# Patient Record
Sex: FEMALE | Race: WHITE | Employment: FULL TIME | URBAN - METROPOLITAN AREA
[De-identification: names, ages, dates, MRNs, and addresses within clinical notes are randomized per-mention and may not be internally consistent; named-entity substitution may affect disease eponyms.]

---

## 2017-01-05 ENCOUNTER — OFFICE VISIT (OUTPATIENT)
Dept: INTERNAL MEDICINE CLINIC | Facility: CLINIC | Age: 27
End: 2017-01-05

## 2017-01-05 VITALS
TEMPERATURE: 98 F | SYSTOLIC BLOOD PRESSURE: 126 MMHG | BODY MASS INDEX: 37.95 KG/M2 | OXYGEN SATURATION: 99 % | HEART RATE: 104 BPM | WEIGHT: 206.25 LBS | HEIGHT: 62 IN | DIASTOLIC BLOOD PRESSURE: 88 MMHG | RESPIRATION RATE: 22 BRPM

## 2017-01-05 DIAGNOSIS — E66.9 OBESITY (BMI 30-39.9): Primary | ICD-10-CM

## 2017-01-05 DIAGNOSIS — Z51.81 THERAPEUTIC DRUG MONITORING: ICD-10-CM

## 2017-01-05 PROCEDURE — 99213 OFFICE O/P EST LOW 20 MIN: CPT | Performed by: INTERNAL MEDICINE

## 2017-01-05 RX ORDER — PHENTERMINE HYDROCHLORIDE 37.5 MG/1
37.5 TABLET ORAL
COMMUNITY
End: 2017-02-09

## 2017-01-05 RX ORDER — PHENTERMINE HYDROCHLORIDE 37.5 MG/1
37.5 TABLET ORAL
Qty: 30 TABLET | Refills: 0 | Status: SHIPPED | OUTPATIENT
Start: 2017-01-05 | End: 2017-02-04

## 2017-01-05 NOTE — PROGRESS NOTES
Patient presents with:  Weight Check       HPI: The pt presents today for physician-supervised medical weight loss programming and assessment for the diagnosis of overweight and/or obesity status.   Has been on FDA-approved prescription medication with Phen (bmi 30-39.9)  (primary encounter diagnosis)  Adult bmi 37.0-37.9 kg/sq m  Therapeutic drug monitoring    1. Continue FDA-approved prescription medication for the treatment of obesity. 2. Prescription medication refill given.   3. Continue to work on diet,

## 2017-01-23 ENCOUNTER — TELEPHONE (OUTPATIENT)
Dept: INTERNAL MEDICINE CLINIC | Facility: CLINIC | Age: 27
End: 2017-01-23

## 2017-01-23 NOTE — TELEPHONE ENCOUNTER
I agree. Stephany Liao. Archie Crawford MD  Diplomate, American Board of Internal Medicine  [facility]  [address] [facility],[address] Floor, [address], [address], [address]  T: [phone]; F: Veronica 5

## 2017-01-23 NOTE — TELEPHONE ENCOUNTER
Mother left message on triage line stating pt with possible influenza. Severe nausea and vomiting. Call returned and spoke with pt who stated she has been vomiting since 1 am.   She is only able to keep small sips of liquid down. C/o chills and fatigue.

## 2017-02-09 ENCOUNTER — OFFICE VISIT (OUTPATIENT)
Dept: INTERNAL MEDICINE CLINIC | Facility: CLINIC | Age: 27
End: 2017-02-09

## 2017-02-09 VITALS
TEMPERATURE: 99 F | HEART RATE: 126 BPM | BODY MASS INDEX: 37.17 KG/M2 | SYSTOLIC BLOOD PRESSURE: 122 MMHG | DIASTOLIC BLOOD PRESSURE: 80 MMHG | HEIGHT: 62 IN | WEIGHT: 202 LBS | OXYGEN SATURATION: 96 % | RESPIRATION RATE: 24 BRPM

## 2017-02-09 DIAGNOSIS — Z51.81 THERAPEUTIC DRUG MONITORING: ICD-10-CM

## 2017-02-09 DIAGNOSIS — E66.9 OBESITY (BMI 30-39.9): Primary | ICD-10-CM

## 2017-02-09 PROCEDURE — 99213 OFFICE O/P EST LOW 20 MIN: CPT | Performed by: INTERNAL MEDICINE

## 2017-02-09 RX ORDER — PHENTERMINE HYDROCHLORIDE 37.5 MG/1
37.5 TABLET ORAL
Qty: 30 TABLET | Refills: 0 | Status: SHIPPED | OUTPATIENT
Start: 2017-02-09 | End: 2017-03-16

## 2017-02-09 NOTE — PROGRESS NOTES
Patient presents with:  Weight Check       HPI: The pt presents today for physician-supervised medical weight loss programming and assessment for the diagnosis of overweight and/or obesity status.   Has been on FDA-approved prescription medication with Phen for the treatment of obesity. 2. Prescription medication refill given. 3. Continue to work on diet, exercise, stressor reduction, and other formal weight loss measures. 4. RTC 1 month for physician-supervised medical weight loss programming.     Troy Rice.

## 2017-02-16 ENCOUNTER — TELEPHONE (OUTPATIENT)
Dept: INTERNAL MEDICINE CLINIC | Facility: CLINIC | Age: 27
End: 2017-02-16

## 2017-03-16 ENCOUNTER — OFFICE VISIT (OUTPATIENT)
Dept: INTERNAL MEDICINE CLINIC | Facility: CLINIC | Age: 27
End: 2017-03-16

## 2017-03-16 VITALS
RESPIRATION RATE: 22 BRPM | TEMPERATURE: 99 F | SYSTOLIC BLOOD PRESSURE: 122 MMHG | HEART RATE: 101 BPM | WEIGHT: 202 LBS | HEIGHT: 62 IN | BODY MASS INDEX: 37.17 KG/M2 | OXYGEN SATURATION: 100 % | DIASTOLIC BLOOD PRESSURE: 70 MMHG

## 2017-03-16 DIAGNOSIS — E66.01 SEVERE OBESITY (BMI 35.0-39.9): Primary | ICD-10-CM

## 2017-03-16 DIAGNOSIS — Z51.81 THERAPEUTIC DRUG MONITORING: ICD-10-CM

## 2017-03-16 PROBLEM — E66.9 OBESITY (BMI 30-39.9): Status: RESOLVED | Noted: 2017-01-05 | Resolved: 2017-03-16

## 2017-03-16 PROCEDURE — 99213 OFFICE O/P EST LOW 20 MIN: CPT | Performed by: INTERNAL MEDICINE

## 2017-03-16 RX ORDER — PHENTERMINE HYDROCHLORIDE 37.5 MG/1
37.5 TABLET ORAL
Qty: 30 TABLET | Refills: 0 | Status: SHIPPED | OUTPATIENT
Start: 2017-03-16 | End: 2017-05-01

## 2017-03-16 NOTE — PROGRESS NOTES
Patient presents with:  Weight Check       HPI: The pt presents today for physician-supervised medical weight loss programming and assessment for the diagnosis of overweight and/or obesity status.   Has been on FDA-approved prescription medication with Phen FDA-approved prescription medication for the treatment of obesity. 2. Prescription medication refill given. 3. Add Metformin 500 mg q AM.  4. Continue to work on diet, exercise, stressor reduction, and other formal weight loss measures.   5. RTC 1 month f

## 2017-03-21 ENCOUNTER — NURSE ONLY (OUTPATIENT)
Dept: FAMILY MEDICINE CLINIC | Facility: CLINIC | Age: 27
End: 2017-03-21

## 2017-03-21 VITALS
HEART RATE: 92 BPM | RESPIRATION RATE: 20 BRPM | TEMPERATURE: 98 F | WEIGHT: 201 LBS | HEIGHT: 62 IN | OXYGEN SATURATION: 98 % | SYSTOLIC BLOOD PRESSURE: 116 MMHG | BODY MASS INDEX: 36.99 KG/M2 | DIASTOLIC BLOOD PRESSURE: 84 MMHG

## 2017-03-21 DIAGNOSIS — R59.0 POSTERIOR CERVICAL LYMPHADENOPATHY: ICD-10-CM

## 2017-03-21 DIAGNOSIS — H92.01 OTALGIA OF RIGHT EAR: Primary | ICD-10-CM

## 2017-03-21 DIAGNOSIS — R59.0 PREAURICULAR LYMPHADENOPATHY: ICD-10-CM

## 2017-03-21 PROCEDURE — 99213 OFFICE O/P EST LOW 20 MIN: CPT | Performed by: NURSE PRACTITIONER

## 2017-03-21 NOTE — PATIENT INSTRUCTIONS
Lymphadenopathy  Lymphadenopathy is swelling of the lymph nodes. Lymph nodes are small, bean-shaped glands around the body. What are lymph nodes? Lymph nodes are part of your immune system.  The glands are found in your neck, armpits, groin, chest, and You may also have symptoms from an infection causing the swollen glands. These symptoms may include fever, sore throat, body aches, or cough. Diagnosing lymphadenopathy  Your health care provider will ask about your health history and symptoms.  He or she © 7834-8256 [address], [address] [place]. All rights reserved. This information is not intended as a substitute for professional medical care. Always follow your healthcare professional's instructions.

## 2017-03-21 NOTE — PROGRESS NOTES
Barbara Flores is a 23year old female. CHIEF COMPLAINT:   Patient presents with:  Ear Pain: right ear x 2 days      HPI:   The patient complains of  2 day history of right ear pain. Denies reduced hearing in affected ear(s). No fever.  No recent URI symptoms THROAT: oral mucosa pink, moist. Posterior pharynx is not erythematous or injected. No exudates  NECK: supple, non-tender  LUNGS: clear to auscultation bilaterally, no wheezes or rhonchi.  Breathing is non labored  CARDIO: RRR without murmur  EXTREMITIES: n Dead cells and fluid build up in the lymph nodes as they help fight infection or disease. This causes them to swell in size. Enlarged lymph nodes are often near the source of infection. This can help to find the cause of an infection.  For example, swollen · Lymph node biopsy. If lymph nodes are swollen for 3 to 4 weeks, they may be checked with a biopsy. Small samples of lymph node tissue are taken and checked in a lab for signs of cancer.  You may be referred to a specialist in blood disorders and cancer (h

## 2017-05-01 DIAGNOSIS — Z51.81 THERAPEUTIC DRUG MONITORING: Primary | ICD-10-CM

## 2017-05-01 DIAGNOSIS — E66.01 SEVERE OBESITY (BMI 35.0-39.9): ICD-10-CM

## 2017-05-01 RX ORDER — PHENTERMINE HYDROCHLORIDE 37.5 MG/1
37.5 TABLET ORAL
Qty: 30 TABLET | Refills: 0 | Status: SHIPPED | OUTPATIENT
Start: 2017-05-01 | End: 2017-05-04

## 2017-05-04 ENCOUNTER — OFFICE VISIT (OUTPATIENT)
Dept: INTERNAL MEDICINE CLINIC | Facility: CLINIC | Age: 27
End: 2017-05-04

## 2017-05-04 VITALS
SYSTOLIC BLOOD PRESSURE: 126 MMHG | WEIGHT: 200.5 LBS | HEART RATE: 122 BPM | DIASTOLIC BLOOD PRESSURE: 74 MMHG | BODY MASS INDEX: 36.9 KG/M2 | HEIGHT: 62 IN | OXYGEN SATURATION: 100 % | RESPIRATION RATE: 22 BRPM | TEMPERATURE: 99 F

## 2017-05-04 DIAGNOSIS — Z51.81 THERAPEUTIC DRUG MONITORING: ICD-10-CM

## 2017-05-04 DIAGNOSIS — L98.9 SKIN DISORDER: ICD-10-CM

## 2017-05-04 DIAGNOSIS — E66.01 SEVERE OBESITY (BMI 35.0-39.9): Primary | ICD-10-CM

## 2017-05-04 PROCEDURE — 99213 OFFICE O/P EST LOW 20 MIN: CPT | Performed by: INTERNAL MEDICINE

## 2017-05-04 RX ORDER — BETAMETHASONE DIPROPIONATE 0.5 MG/G
CREAM TOPICAL
Qty: 45 G | Refills: 0 | Status: SHIPPED | OUTPATIENT
Start: 2017-05-04 | End: 2019-10-01

## 2017-05-04 RX ORDER — PHENTERMINE HYDROCHLORIDE 37.5 MG/1
37.5 TABLET ORAL
Qty: 30 TABLET | Refills: 0 | Status: SHIPPED | OUTPATIENT
Start: 2017-05-04 | End: 2017-05-04

## 2017-05-04 NOTE — PROGRESS NOTES
Patient presents with:  Weight Check       HPI: The pt presents today for physician-supervised medical weight loss programming and assessment for the diagnosis of overweight and/or obesity status.   Has been on FDA-approved prescription medication with Phen lb  Gen - A&Ox3, NAD, obese  HEENT - PERRL, EOMI, OP is clear  Lungs - CTAB  CV - RRR, nl s1, s2, no M  Psych - nl mood/affect  Skin - inflamed, dry patch on back of neck      A/P:  Severe obesity (bmi 35.0-39.9) (hcc)  (primary encounter diagnosis)  Adult

## 2017-06-05 ENCOUNTER — OFFICE VISIT (OUTPATIENT)
Dept: INTERNAL MEDICINE CLINIC | Facility: CLINIC | Age: 27
End: 2017-06-05

## 2017-06-05 VITALS
DIASTOLIC BLOOD PRESSURE: 74 MMHG | HEIGHT: 62 IN | TEMPERATURE: 99 F | SYSTOLIC BLOOD PRESSURE: 118 MMHG | RESPIRATION RATE: 23 BRPM | HEART RATE: 96 BPM | BODY MASS INDEX: 36.53 KG/M2 | WEIGHT: 198.5 LBS

## 2017-06-05 DIAGNOSIS — E66.01 SEVERE OBESITY (BMI 35.0-39.9): Primary | ICD-10-CM

## 2017-06-05 DIAGNOSIS — Z51.81 THERAPEUTIC DRUG MONITORING: ICD-10-CM

## 2017-06-05 PROCEDURE — 99213 OFFICE O/P EST LOW 20 MIN: CPT | Performed by: INTERNAL MEDICINE

## 2017-06-05 RX ORDER — PHENTERMINE HYDROCHLORIDE 37.5 MG/1
37.5 TABLET ORAL
COMMUNITY
End: 2017-06-05

## 2017-06-05 RX ORDER — PHENTERMINE HYDROCHLORIDE 37.5 MG/1
37.5 TABLET ORAL
Qty: 30 TABLET | Refills: 0 | Status: SHIPPED | OUTPATIENT
Start: 2017-06-05 | End: 2017-07-06

## 2017-06-05 NOTE — PROGRESS NOTES
Patient presents with:  Weight Check       HPI: The pt presents today for physician-supervised medical weight loss programming and assessment for the diagnosis of overweight and/or obesity status.   Has been on FDA-approved prescription medication with Phen obese  HEENT - PERRL, EOMI, OP is clear  Lungs - CTAB  CV - RRR, nl s1, s2, no M  Psych - nl mood/affect      A/P:  Severe obesity (bmi 35.0-39.9) (hcc)  (primary encounter diagnosis)  Adult bmi 36.0-36.9 kg/sq m  Therapeutic drug monitoring    1.  Continue

## 2017-07-06 ENCOUNTER — OFFICE VISIT (OUTPATIENT)
Dept: INTERNAL MEDICINE CLINIC | Facility: CLINIC | Age: 27
End: 2017-07-06

## 2017-07-06 VITALS
SYSTOLIC BLOOD PRESSURE: 122 MMHG | BODY MASS INDEX: 36.48 KG/M2 | DIASTOLIC BLOOD PRESSURE: 82 MMHG | HEIGHT: 62 IN | WEIGHT: 198.25 LBS | HEART RATE: 60 BPM | RESPIRATION RATE: 12 BRPM | TEMPERATURE: 98 F

## 2017-07-06 DIAGNOSIS — E66.01 SEVERE OBESITY (BMI 35.0-39.9): Primary | ICD-10-CM

## 2017-07-06 DIAGNOSIS — Z51.81 THERAPEUTIC DRUG MONITORING: ICD-10-CM

## 2017-07-06 PROCEDURE — 99213 OFFICE O/P EST LOW 20 MIN: CPT | Performed by: INTERNAL MEDICINE

## 2017-07-06 RX ORDER — PHENTERMINE HYDROCHLORIDE 37.5 MG/1
37.5 TABLET ORAL
Qty: 30 TABLET | Refills: 0 | Status: SHIPPED | OUTPATIENT
Start: 2017-07-06 | End: 2017-08-08

## 2017-07-06 NOTE — PROGRESS NOTES
Patient presents with: Follow - Up: Weight check        HPI: The pt presents today for physician-supervised medical weight loss programming and assessment for the diagnosis of overweight and/or obesity status.   Has been on FDA-approved prescription medica oz  05/04/17 : 200 lb 8 oz  03/21/17 : 201 lb  03/16/17 : 202 lb  02/09/17 : 202 lb  Gen - A&Ox3, NAD, obese  HEENT - PERRL, EOMI, OP is clear  Lungs - CTAB  CV - RRR, nl s1, s2, no M  Psych - nl mood/affect      A/P:  Severe obesity (bmi 35.0-39.9) (hcc)

## 2017-08-08 ENCOUNTER — OFFICE VISIT (OUTPATIENT)
Dept: INTERNAL MEDICINE CLINIC | Facility: CLINIC | Age: 27
End: 2017-08-08

## 2017-08-08 VITALS
HEIGHT: 62 IN | HEART RATE: 68 BPM | BODY MASS INDEX: 36.12 KG/M2 | DIASTOLIC BLOOD PRESSURE: 80 MMHG | TEMPERATURE: 99 F | RESPIRATION RATE: 16 BRPM | WEIGHT: 196.25 LBS | SYSTOLIC BLOOD PRESSURE: 112 MMHG

## 2017-08-08 DIAGNOSIS — Z51.81 THERAPEUTIC DRUG MONITORING: ICD-10-CM

## 2017-08-08 DIAGNOSIS — E66.01 SEVERE OBESITY (BMI 35.0-39.9): Primary | ICD-10-CM

## 2017-08-08 PROCEDURE — 99213 OFFICE O/P EST LOW 20 MIN: CPT | Performed by: INTERNAL MEDICINE

## 2017-08-08 RX ORDER — PHENTERMINE HYDROCHLORIDE 37.5 MG/1
37.5 TABLET ORAL
Qty: 30 TABLET | Refills: 0 | Status: SHIPPED | OUTPATIENT
Start: 2017-08-08 | End: 2017-09-07

## 2017-08-08 NOTE — PROGRESS NOTES
Patient presents with:  Weight Check       HPI: The pt presents today for physician-supervised medical weight loss programming and assessment for the diagnosis of overweight and/or obesity status.   Has been on FDA-approved prescription medication with Phen oz  06/05/17 : 198 lb 8 oz  05/04/17 : 200 lb 8 oz  03/21/17 : 201 lb  03/16/17 : 202 lb  Gen - A&Ox3, NAD, obese  HEENT - PERRL, EOMI, OP is clear  Lungs - CTAB  CV - RRR, nl s1, s2, no M  Psych - nl mood/affect      A/P:  Severe obesity (bmi 35.0-39.9) (

## 2017-09-07 ENCOUNTER — TELEPHONE (OUTPATIENT)
Dept: INTERNAL MEDICINE CLINIC | Facility: CLINIC | Age: 27
End: 2017-09-07

## 2017-09-07 RX ORDER — PHENTERMINE HYDROCHLORIDE 37.5 MG/1
37.5 TABLET ORAL
Qty: 30 TABLET | Refills: 0 | Status: SHIPPED | OUTPATIENT
Start: 2017-09-07 | End: 2017-10-09

## 2017-09-07 NOTE — TELEPHONE ENCOUNTER
Patient called in requesting a refill for Phentermine HCl 37.5 MG Oral Tab    Appointment Date: 09/19/17

## 2017-09-19 ENCOUNTER — OFFICE VISIT (OUTPATIENT)
Dept: INTERNAL MEDICINE CLINIC | Facility: CLINIC | Age: 27
End: 2017-09-19

## 2017-09-19 VITALS
WEIGHT: 197.5 LBS | RESPIRATION RATE: 16 BRPM | SYSTOLIC BLOOD PRESSURE: 108 MMHG | TEMPERATURE: 98 F | BODY MASS INDEX: 36.34 KG/M2 | HEIGHT: 62 IN | HEART RATE: 100 BPM | DIASTOLIC BLOOD PRESSURE: 88 MMHG | OXYGEN SATURATION: 99 %

## 2017-09-19 DIAGNOSIS — Z23 FLU VACCINE NEED: ICD-10-CM

## 2017-09-19 DIAGNOSIS — E66.01 SEVERE OBESITY (BMI 35.0-39.9): Primary | ICD-10-CM

## 2017-09-19 DIAGNOSIS — Z51.81 THERAPEUTIC DRUG MONITORING: ICD-10-CM

## 2017-09-19 PROCEDURE — 99213 OFFICE O/P EST LOW 20 MIN: CPT | Performed by: INTERNAL MEDICINE

## 2017-09-19 PROCEDURE — 90471 IMMUNIZATION ADMIN: CPT | Performed by: INTERNAL MEDICINE

## 2017-09-19 PROCEDURE — 90686 IIV4 VACC NO PRSV 0.5 ML IM: CPT | Performed by: INTERNAL MEDICINE

## 2017-09-19 NOTE — PROGRESS NOTES
Patient presents with:  Weight Check: medication refill        HPI: The pt presents today for physician-supervised medical weight loss programming and assessment for the diagnosis of overweight and/or obesity status.   Has been on FDA-approved prescription 98.3 °F (36.8 °C) (Oral)   Resp 16   Ht 62\"   Wt 197 lb 8 oz   LMP 08/31/2017   SpO2 99%   BMI 36.12 kg/m²   Wt Readings from Last 6 Encounters:  09/19/17 : 197 lb 8 oz  08/08/17 : 196 lb 4 oz  07/06/17 : 198 lb 4 oz  06/05/17 : 198 lb 8 oz  05/04/17 : 20

## 2017-10-09 ENCOUNTER — TELEPHONE (OUTPATIENT)
Dept: INTERNAL MEDICINE CLINIC | Facility: CLINIC | Age: 27
End: 2017-10-09

## 2017-10-09 RX ORDER — PHENTERMINE HYDROCHLORIDE 37.5 MG/1
37.5 TABLET ORAL
Qty: 30 TABLET | Refills: 0 | Status: SHIPPED | OUTPATIENT
Start: 2017-10-09 | End: 2017-11-07

## 2017-10-09 NOTE — TELEPHONE ENCOUNTER
Patient had appt 630 for med f/u/month and we had to r/s for Dr Kemi Samuels; she is r/s for 11-7-17 so she will need auth for refill from him.  Her mother Jada Mann can  today  Please request refill

## 2017-11-07 ENCOUNTER — OFFICE VISIT (OUTPATIENT)
Dept: INTERNAL MEDICINE CLINIC | Facility: CLINIC | Age: 27
End: 2017-11-07

## 2017-11-07 VITALS
HEIGHT: 62 IN | BODY MASS INDEX: 36.16 KG/M2 | HEART RATE: 88 BPM | RESPIRATION RATE: 22 BRPM | SYSTOLIC BLOOD PRESSURE: 120 MMHG | TEMPERATURE: 98 F | WEIGHT: 196.5 LBS | DIASTOLIC BLOOD PRESSURE: 80 MMHG

## 2017-11-07 DIAGNOSIS — Z51.81 THERAPEUTIC DRUG MONITORING: ICD-10-CM

## 2017-11-07 DIAGNOSIS — E66.01 SEVERE OBESITY (BMI 35.0-39.9): Primary | ICD-10-CM

## 2017-11-07 PROCEDURE — 99213 OFFICE O/P EST LOW 20 MIN: CPT | Performed by: INTERNAL MEDICINE

## 2017-11-07 RX ORDER — PHENTERMINE HYDROCHLORIDE 37.5 MG/1
37.5 TABLET ORAL
Qty: 30 TABLET | Refills: 0 | Status: SHIPPED | OUTPATIENT
Start: 2017-11-07 | End: 2017-12-09

## 2017-11-07 NOTE — PROGRESS NOTES
Patient presents with: Follow - Up: 1 month follow up        HPI: The pt presents today for physician-supervised medical weight loss programming and assessment for the diagnosis of overweight and/or obesity status.   Has been on FDA-approved prescription m °F (36.7 °C) (Oral)   Resp 22   Ht 62\"   Wt 196 lb 8 oz   LMP 10/26/2017   BMI 35.94 kg/m²   Wt Readings from Last 6 Encounters:  11/07/17 : 196 lb 8 oz  09/19/17 : 197 lb 8 oz  08/08/17 : 196 lb 4 oz  07/06/17 : 198 lb 4 oz  06/05/17 : 198 lb 8 oz  05/04

## 2017-12-09 ENCOUNTER — OFFICE VISIT (OUTPATIENT)
Dept: INTERNAL MEDICINE CLINIC | Facility: CLINIC | Age: 27
End: 2017-12-09

## 2017-12-09 VITALS
DIASTOLIC BLOOD PRESSURE: 86 MMHG | HEART RATE: 112 BPM | BODY MASS INDEX: 36.34 KG/M2 | OXYGEN SATURATION: 94 % | WEIGHT: 197.5 LBS | TEMPERATURE: 99 F | RESPIRATION RATE: 20 BRPM | HEIGHT: 62 IN | SYSTOLIC BLOOD PRESSURE: 122 MMHG

## 2017-12-09 DIAGNOSIS — E66.01 SEVERE OBESITY (BMI 35.0-39.9): Primary | ICD-10-CM

## 2017-12-09 DIAGNOSIS — Z51.81 THERAPEUTIC DRUG MONITORING: ICD-10-CM

## 2017-12-09 PROCEDURE — 99213 OFFICE O/P EST LOW 20 MIN: CPT | Performed by: INTERNAL MEDICINE

## 2017-12-09 RX ORDER — PHENTERMINE HYDROCHLORIDE 37.5 MG/1
37.5 TABLET ORAL
Qty: 30 TABLET | Refills: 0 | Status: SHIPPED | OUTPATIENT
Start: 2017-12-09 | End: 2018-01-09

## 2017-12-09 NOTE — PROGRESS NOTES
Patient presents with: Follow - Up: 1 month        HPI: The pt presents today for physician-supervised medical weight loss programming and assessment for the diagnosis of overweight and/or obesity status.   Has been on FDA-approved prescription medication Readings from Last 6 Encounters:  12/09/17 : 197 lb 8 oz  11/07/17 : 196 lb 8 oz  09/19/17 : 197 lb 8 oz  08/08/17 : 196 lb 4 oz  07/06/17 : 198 lb 4 oz  06/05/17 : 198 lb 8 oz  Gen - A&Ox3, NAD  HEENT - PERRL, EOMI, OP is clear  Lungs - CTAB  CV - RRR, nl

## 2018-01-09 ENCOUNTER — OFFICE VISIT (OUTPATIENT)
Dept: INTERNAL MEDICINE CLINIC | Facility: CLINIC | Age: 28
End: 2018-01-09

## 2018-01-09 VITALS
TEMPERATURE: 98 F | HEART RATE: 81 BPM | BODY MASS INDEX: 36.53 KG/M2 | WEIGHT: 198.5 LBS | RESPIRATION RATE: 21 BRPM | DIASTOLIC BLOOD PRESSURE: 80 MMHG | SYSTOLIC BLOOD PRESSURE: 122 MMHG | HEIGHT: 62 IN

## 2018-01-09 DIAGNOSIS — E66.01 SEVERE OBESITY (BMI 35.0-39.9): Primary | ICD-10-CM

## 2018-01-09 DIAGNOSIS — Z51.81 THERAPEUTIC DRUG MONITORING: ICD-10-CM

## 2018-01-09 PROCEDURE — 99213 OFFICE O/P EST LOW 20 MIN: CPT | Performed by: INTERNAL MEDICINE

## 2018-01-09 RX ORDER — PHENTERMINE HYDROCHLORIDE 37.5 MG/1
37.5 TABLET ORAL
Qty: 30 TABLET | Refills: 0 | Status: SHIPPED | OUTPATIENT
Start: 2018-01-09 | End: 2018-02-15

## 2018-01-10 NOTE — PROGRESS NOTES
Patient presents with:  Weight Check      HPI: The pt presents today for physician-supervised medical weight loss programming and assessment for the diagnosis of overweight and/or obesity status.   Has been on FDA-approved prescription medication with Phent Encounters:  01/09/18 : 198 lb 8 oz  12/09/17 : 197 lb 8 oz  11/07/17 : 196 lb 8 oz  09/19/17 : 197 lb 8 oz  08/08/17 : 196 lb 4 oz  07/06/17 : 198 lb 4 oz  Gen - A&Ox3, NAD, morbidly obese  HEENT - PERRL, EOMI, OP is clear  Lungs - CTAB  CV - RRR, nl s1,

## 2018-02-15 ENCOUNTER — OFFICE VISIT (OUTPATIENT)
Dept: INTERNAL MEDICINE CLINIC | Facility: CLINIC | Age: 28
End: 2018-02-15

## 2018-02-15 VITALS
BODY MASS INDEX: 36.71 KG/M2 | HEIGHT: 62 IN | DIASTOLIC BLOOD PRESSURE: 86 MMHG | HEART RATE: 86 BPM | TEMPERATURE: 99 F | SYSTOLIC BLOOD PRESSURE: 124 MMHG | RESPIRATION RATE: 16 BRPM | WEIGHT: 199.5 LBS

## 2018-02-15 DIAGNOSIS — Z51.81 THERAPEUTIC DRUG MONITORING: ICD-10-CM

## 2018-02-15 DIAGNOSIS — E66.01 SEVERE OBESITY (BMI 35.0-39.9): Primary | ICD-10-CM

## 2018-02-15 PROCEDURE — 99213 OFFICE O/P EST LOW 20 MIN: CPT | Performed by: INTERNAL MEDICINE

## 2018-02-15 RX ORDER — PHENTERMINE HYDROCHLORIDE 37.5 MG/1
37.5 TABLET ORAL
Qty: 30 TABLET | Refills: 0 | Status: SHIPPED | OUTPATIENT
Start: 2018-02-15 | End: 2018-03-20

## 2018-02-15 RX ORDER — TOPIRAMATE 25 MG/1
25 TABLET ORAL DAILY
COMMUNITY
End: 2018-03-20 | Stop reason: SINTOL

## 2018-02-15 NOTE — PROGRESS NOTES
Patient presents with:  Weight Check: Est Pt. 1 month follow up       HPI: The pt presents today for physician-supervised medical weight loss programming and assessment for the diagnosis of overweight and/or obesity status.   Has been on FDA-approved prescr 124/86   Pulse 86   Temp 98.6 °F (37 °C) (Oral)   Resp 16   Ht 62\"   Wt 199 lb 8 oz   BMI 36.49 kg/m²   Wt Readings from Last 6 Encounters:  02/15/18 : 199 lb 8 oz  01/09/18 : 198 lb 8 oz  12/09/17 : 197 lb 8 oz  11/07/17 : 196 lb 8 oz  09/19/17 : 197 lb

## 2018-03-20 ENCOUNTER — OFFICE VISIT (OUTPATIENT)
Dept: INTERNAL MEDICINE CLINIC | Facility: CLINIC | Age: 28
End: 2018-03-20

## 2018-03-20 VITALS
WEIGHT: 201.75 LBS | TEMPERATURE: 98 F | SYSTOLIC BLOOD PRESSURE: 118 MMHG | HEIGHT: 62 IN | RESPIRATION RATE: 16 BRPM | DIASTOLIC BLOOD PRESSURE: 84 MMHG | BODY MASS INDEX: 37.13 KG/M2 | HEART RATE: 88 BPM

## 2018-03-20 DIAGNOSIS — Z51.81 THERAPEUTIC DRUG MONITORING: ICD-10-CM

## 2018-03-20 DIAGNOSIS — E66.01 SEVERE OBESITY (BMI 35.0-39.9): Primary | ICD-10-CM

## 2018-03-20 PROCEDURE — 99213 OFFICE O/P EST LOW 20 MIN: CPT | Performed by: INTERNAL MEDICINE

## 2018-03-20 RX ORDER — PHENTERMINE HYDROCHLORIDE 37.5 MG/1
37.5 TABLET ORAL
Qty: 30 TABLET | Refills: 0 | Status: SHIPPED | OUTPATIENT
Start: 2018-03-20 | End: 2018-04-26

## 2018-03-20 RX ORDER — BUPROPION HYDROCHLORIDE 75 MG/1
75 TABLET ORAL 2 TIMES DAILY
Qty: 60 TABLET | Refills: 0 | Status: SHIPPED | OUTPATIENT
Start: 2018-03-20 | End: 2018-04-26

## 2018-03-21 NOTE — PROGRESS NOTES
Patient presents with:  Weight Check       HPI: The pt presents today for physician-supervised medical weight loss programming and assessment for the diagnosis of overweight and/or obesity status.   Has been on FDA-approved prescription medication with Phen medication for the treatment of obesity. 2. Prescription medication refill for Phentermine given. 3. Start Bupropion 75 mg BID. 4. Continue to work on diet, exercise, stressor reduction, and other formal weight loss measures.   5. RTC 1 month for jaxon

## 2018-04-06 ENCOUNTER — LAB ENCOUNTER (OUTPATIENT)
Dept: LAB | Facility: HOSPITAL | Age: 28
End: 2018-04-06
Attending: INTERNAL MEDICINE
Payer: COMMERCIAL

## 2018-04-06 DIAGNOSIS — E66.01 SEVERE OBESITY (BMI 35.0-39.9): ICD-10-CM

## 2018-04-06 PROCEDURE — 36415 COLL VENOUS BLD VENIPUNCTURE: CPT

## 2018-04-06 PROCEDURE — 80061 LIPID PANEL: CPT

## 2018-04-06 PROCEDURE — 80050 GENERAL HEALTH PANEL: CPT

## 2018-04-06 PROCEDURE — 83036 HEMOGLOBIN GLYCOSYLATED A1C: CPT

## 2018-04-17 ENCOUNTER — OFFICE VISIT (OUTPATIENT)
Dept: FAMILY MEDICINE CLINIC | Facility: CLINIC | Age: 28
End: 2018-04-17

## 2018-04-17 VITALS
SYSTOLIC BLOOD PRESSURE: 124 MMHG | BODY MASS INDEX: 37 KG/M2 | WEIGHT: 200 LBS | DIASTOLIC BLOOD PRESSURE: 80 MMHG | TEMPERATURE: 99 F | RESPIRATION RATE: 16 BRPM | OXYGEN SATURATION: 98 % | HEART RATE: 102 BPM

## 2018-04-17 DIAGNOSIS — J02.9 SORE THROAT: Primary | ICD-10-CM

## 2018-04-17 PROCEDURE — 87880 STREP A ASSAY W/OPTIC: CPT | Performed by: PHYSICIAN ASSISTANT

## 2018-04-17 PROCEDURE — 99213 OFFICE O/P EST LOW 20 MIN: CPT | Performed by: PHYSICIAN ASSISTANT

## 2018-04-17 NOTE — PROGRESS NOTES
CHIEF COMPLAINT:   Patient presents with:  Sore Throat: for 2 days, this am sore throat worse  Nasal Congestion      HPI:   Maribell Hayes is a 33year old female who presents with sore throat and intermittent sinus congestion.  Symptoms of sore throat have be Maternal Grandfather    • Heart Disorder Paternal Grandmother    • Heart Disease Paternal Grandmother    • Other [OTHER] Paternal Grandmother    • Diabetes Paternal Grandfather    • Heart Disease Paternal Grandfather       Smoking status: Never Smoker Yes/No   Kit Lot # [id number] Numeric   Kit Expiration Date 12/31/19 Date       ASSESSMENT:   Slick Coppola is a 33year old female who presents with Sore Throat (for 2 days, this am sore throat worse) and Nasal Congestion.  Symptoms are consistent with:    S

## 2018-04-17 NOTE — PATIENT INSTRUCTIONS
When You Have a Sore Throat    A sore throat can be painful. There are many reasons why you may have a sore throat. Your healthcare provider will work with you to find the cause of your sore throat. He or she will also find the best treatment for you.   Yohan Desir During the exam, your healthcare provider checks your ears, nose, and throat for problems.  He or she also checks for swelling in the neck, and may listen to your chest.  Possible tests  Other tests your healthcare provider may perform include:  · A throat If your sore throat is due to a bacterial infection, antibiotics may speed healing and prevent complications.  Although group A streptococcus (\"strep throat\" or GAS) is the major treatable infection for a sore throat, GAS causes only 5% to 15% of sore thr © 7056-4530 [address] [facility], [facility] [place]. All rights reserved. This information is not intended as a substitute for professional medical care. Always follow your healthcare professional's instructions.

## 2018-04-26 ENCOUNTER — OFFICE VISIT (OUTPATIENT)
Dept: INTERNAL MEDICINE CLINIC | Facility: CLINIC | Age: 28
End: 2018-04-26

## 2018-04-26 VITALS
WEIGHT: 202 LBS | TEMPERATURE: 98 F | SYSTOLIC BLOOD PRESSURE: 110 MMHG | DIASTOLIC BLOOD PRESSURE: 76 MMHG | BODY MASS INDEX: 37.17 KG/M2 | RESPIRATION RATE: 16 BRPM | HEART RATE: 80 BPM | HEIGHT: 62 IN

## 2018-04-26 DIAGNOSIS — E66.01 SEVERE OBESITY (BMI 35.0-39.9): Primary | ICD-10-CM

## 2018-04-26 DIAGNOSIS — Z51.81 THERAPEUTIC DRUG MONITORING: ICD-10-CM

## 2018-04-26 PROCEDURE — 99213 OFFICE O/P EST LOW 20 MIN: CPT | Performed by: INTERNAL MEDICINE

## 2018-04-26 RX ORDER — PHENTERMINE HYDROCHLORIDE 37.5 MG/1
37.5 TABLET ORAL
Qty: 30 TABLET | Refills: 0 | Status: SHIPPED | OUTPATIENT
Start: 2018-04-26 | End: 2018-05-30

## 2018-04-26 RX ORDER — BUPROPION HYDROCHLORIDE 75 MG/1
75 TABLET ORAL 2 TIMES DAILY
Qty: 60 TABLET | Refills: 0 | Status: SHIPPED | OUTPATIENT
Start: 2018-04-26 | End: 2018-11-13

## 2018-04-26 NOTE — PROGRESS NOTES
Patient presents with:  Medication Follow-Up       HPI: The pt presents today for physician-supervised medical weight loss programming and assessment for the diagnosis of overweight and/or obesity status.   Has been on FDA-approved prescription medication w BMI 36.95 kg/m²   Wt Readings from Last 6 Encounters:  04/26/18 : 202 lb  04/17/18 : 200 lb  03/20/18 : 201 lb 12 oz  02/15/18 : 199 lb 8 oz  01/09/18 : 198 lb 8 oz  12/09/17 : 197 lb 8 oz  Gen - A&Ox3, NAD, obese  HEENT - PERRL, EOMI, OP is clear  Lungs -

## 2018-05-30 DIAGNOSIS — E66.01 SEVERE OBESITY (BMI 35.0-39.9): ICD-10-CM

## 2018-05-30 DIAGNOSIS — Z51.81 THERAPEUTIC DRUG MONITORING: ICD-10-CM

## 2018-05-31 RX ORDER — PHENTERMINE HYDROCHLORIDE 37.5 MG/1
37.5 TABLET ORAL
Qty: 30 TABLET | Refills: 0 | Status: SHIPPED | OUTPATIENT
Start: 2018-05-31 | End: 2018-11-13

## 2018-06-17 NOTE — MR AVS SNAPSHOT
[place]  [address] [facility] [address]  [address] [facility] [address] [phone]               Thank you for choosing us for your health care visit with Darek Mi MD.  We are glad to serve you and happy to provide you with this summa You can get these medications from any pharmacy     Bring a paper prescription for each of these medications    - Phentermine HCl 37.5 MG Tabs            MyChart     Visit MyChart  You can access your MyChart to more actively manage your health care and vi 2 ½ hours per week – spread out over time Use a malcolm to keep you motivated   Don’t forget strength training with weights and resistance Set goals and track your progress   You don’t need to join a gym. Home exercises work great.  Put more priority on exe 3 2

## 2018-09-30 ENCOUNTER — NURSE ONLY (OUTPATIENT)
Dept: FAMILY MEDICINE CLINIC | Facility: CLINIC | Age: 28
End: 2018-09-30
Payer: COMMERCIAL

## 2018-09-30 VITALS
SYSTOLIC BLOOD PRESSURE: 120 MMHG | TEMPERATURE: 99 F | OXYGEN SATURATION: 98 % | DIASTOLIC BLOOD PRESSURE: 76 MMHG | RESPIRATION RATE: 14 BRPM | HEIGHT: 62 IN | HEART RATE: 81 BPM | BODY MASS INDEX: 46.01 KG/M2 | WEIGHT: 250 LBS

## 2018-09-30 DIAGNOSIS — J01.40 ACUTE PANSINUSITIS, RECURRENCE NOT SPECIFIED: Primary | ICD-10-CM

## 2018-09-30 PROCEDURE — 99213 OFFICE O/P EST LOW 20 MIN: CPT | Performed by: NURSE PRACTITIONER

## 2018-09-30 RX ORDER — AMOXICILLIN 875 MG/1
875 TABLET, COATED ORAL 2 TIMES DAILY
Qty: 20 TABLET | Refills: 0 | Status: SHIPPED | OUTPATIENT
Start: 2018-09-30 | End: 2018-10-10

## 2018-09-30 NOTE — PROGRESS NOTES
CHIEF COMPLAINT:   Patient presents with:  Sinus Problem      HPI:   Autumn Holt is a 33year old female who presents for cold symptoms for  5  days. Symptoms have progressed into sinus congestion and been worsening since onset.  Sinus congestion/pain is de • Heart Disease Maternal Grandfather    • Heart Disorder Paternal Grandmother    • Heart Disease Paternal Grandmother    • Other (Other) Paternal Grandmother    • Diabetes Paternal Grandfather    • Heart Disease Paternal Grandfather       Social History Meds & Refills for this Visit:  Requested Prescriptions     Signed Prescriptions Disp Refills   • amoxicillin 875 MG Oral Tab 20 tablet 0     Sig: Take 1 tablet (875 mg total) by mouth 2 (two) times daily for 10 days.        Risks, benefits, side effects of Your doctor may prescribe medications to help treat your sinusitis. If you have an infection, antibiotics can help clear it up. If you are prescribed antibiotics, take all pills on schedule until they are gone, even if you feel better.  Decongestants help r

## 2018-11-13 ENCOUNTER — OFFICE VISIT (OUTPATIENT)
Dept: INTERNAL MEDICINE CLINIC | Facility: CLINIC | Age: 28
End: 2018-11-13
Payer: COMMERCIAL

## 2018-11-13 VITALS
TEMPERATURE: 98 F | SYSTOLIC BLOOD PRESSURE: 106 MMHG | BODY MASS INDEX: 40.62 KG/M2 | HEART RATE: 84 BPM | HEIGHT: 62 IN | DIASTOLIC BLOOD PRESSURE: 76 MMHG | RESPIRATION RATE: 16 BRPM | WEIGHT: 220.75 LBS | OXYGEN SATURATION: 99 %

## 2018-11-13 DIAGNOSIS — Z23 NEED FOR INFLUENZA VACCINATION: ICD-10-CM

## 2018-11-13 DIAGNOSIS — Q15.9 EYE ABNORMALITY: Primary | ICD-10-CM

## 2018-11-13 PROCEDURE — 90686 IIV4 VACC NO PRSV 0.5 ML IM: CPT | Performed by: INTERNAL MEDICINE

## 2018-11-13 PROCEDURE — 99213 OFFICE O/P EST LOW 20 MIN: CPT | Performed by: INTERNAL MEDICINE

## 2018-11-13 PROCEDURE — 90471 IMMUNIZATION ADMIN: CPT | Performed by: INTERNAL MEDICINE

## 2018-11-13 NOTE — PATIENT INSTRUCTIONS
[facility] triage nurse to call you this morning with appointment time [phone]      Please have your gynecologist fax over a copy of your Pap smear report so we can add to our system

## 2018-11-13 NOTE — PROGRESS NOTES
[facility] Internal Medicine Office Note  Chief Complaint:   Patient presents with:  Cyst: on corner of right eye since Saturday (11/10/18).         HPI:   This is a 33year old female coming in for eye problem  HPI  R eye - felt like something wa daily x 3-4 weeks (Patient taking differently: Apply topically as needed. Apply to AA daily x 3-4 weeks ) Disp: 60 g Rfl: 1   betamethasone dipropionate 0.05 % External Cream Apply to affected skin twice daily for 5-7 days if needed.  Disp: 45 g Rfl: 0   BE [address] [place] [address] Ave for appt in next days and they will call her to schedule appt. Can use lubricant eye drop prn.      Need for influenza vaccination  -     FLULAVAL INFLUENZA VACCINE QUAD PRESERVATIVE FREE 0.5 ML      -she has appt with gynecologist

## 2019-01-14 ENCOUNTER — HOSPITAL ENCOUNTER (OUTPATIENT)
Age: 29
Discharge: HOME | End: 2019-01-14
Payer: COMMERCIAL

## 2019-01-14 VITALS
TEMPERATURE: 98 F | HEIGHT: 62 IN | RESPIRATION RATE: 16 BRPM | BODY MASS INDEX: 41.41 KG/M2 | WEIGHT: 225 LBS | DIASTOLIC BLOOD PRESSURE: 59 MMHG | OXYGEN SATURATION: 100 % | HEART RATE: 92 BPM | SYSTOLIC BLOOD PRESSURE: 93 MMHG

## 2019-01-14 DIAGNOSIS — S76.911A MUSCLE STRAIN OF RIGHT THIGH, INITIAL ENCOUNTER: Primary | ICD-10-CM

## 2019-01-14 PROCEDURE — 99213 OFFICE O/P EST LOW 20 MIN: CPT

## 2019-01-14 PROCEDURE — 99214 OFFICE O/P EST MOD 30 MIN: CPT

## 2019-01-14 RX ORDER — CYCLOBENZAPRINE HCL 10 MG
10 TABLET ORAL 3 TIMES DAILY PRN
Qty: 20 TABLET | Refills: 0 | Status: SHIPPED | OUTPATIENT
Start: 2019-01-14 | End: 2019-01-21

## 2019-01-14 RX ORDER — IBUPROFEN 600 MG/1
600 TABLET ORAL EVERY 6 HOURS PRN
COMMUNITY
End: 2019-10-01

## 2019-01-14 RX ORDER — NAPROXEN 500 MG/1
500 TABLET ORAL 2 TIMES DAILY PRN
Qty: 20 TABLET | Refills: 0 | Status: SHIPPED | OUTPATIENT
Start: 2019-01-14 | End: 2019-01-21

## 2019-01-15 NOTE — ED PROVIDER NOTES
Patient Seen in: [address]    History   Patient presents with:  Pain (neurologic)    Stated Complaint: LEG PAIN    HPI    Patient is a very pleasant 52-year-old female with a medical history of renal calculi.  2 days prior to a Well appearing, well groomed, alert and aware x 3  Neck: Supple, full range of motion, no thyromegaly or lymphadenopathy.   Eye examination: EOMs are intact, normal conjunctival  ENT: Atraumatic  Lung: No distress, RR, no retraction,   Extremities: Clinical

## 2019-01-15 NOTE — ED INITIAL ASSESSMENT (HPI)
On Saturday pt was doing a cycling class and R leg slipped off of pedal - pt has pain to R thigh and pain when bearing weight - ambulatory now

## 2019-06-29 ENCOUNTER — HOSPITAL ENCOUNTER (OUTPATIENT)
Dept: MRI IMAGING | Age: 29
Discharge: HOME | End: 2019-06-29
Attending: PODIATRIST
Payer: COMMERCIAL

## 2019-06-29 DIAGNOSIS — M94.9 OSTEOCHONDRAL LESION: ICD-10-CM

## 2019-06-29 DIAGNOSIS — M89.9 OSTEOCHONDRAL LESION: ICD-10-CM

## 2019-06-29 PROCEDURE — 73721 MRI JNT OF LWR EXTRE W/O DYE: CPT | Performed by: PODIATRIST

## 2019-10-01 ENCOUNTER — OFFICE VISIT (OUTPATIENT)
Dept: INTERNAL MEDICINE CLINIC | Facility: CLINIC | Age: 29
End: 2019-10-01
Payer: COMMERCIAL

## 2019-10-01 VITALS
BODY MASS INDEX: 45.82 KG/M2 | HEIGHT: 62 IN | TEMPERATURE: 99 F | RESPIRATION RATE: 12 BRPM | DIASTOLIC BLOOD PRESSURE: 84 MMHG | WEIGHT: 249 LBS | OXYGEN SATURATION: 98 % | SYSTOLIC BLOOD PRESSURE: 116 MMHG | HEART RATE: 101 BPM

## 2019-10-01 DIAGNOSIS — J01.90 ACUTE NON-RECURRENT SINUSITIS, UNSPECIFIED LOCATION: Primary | ICD-10-CM

## 2019-10-01 PROCEDURE — 99213 OFFICE O/P EST LOW 20 MIN: CPT | Performed by: NURSE PRACTITIONER

## 2019-10-01 RX ORDER — AMOXICILLIN AND CLAVULANATE POTASSIUM 875; 125 MG/1; MG/1
1 TABLET, FILM COATED ORAL 2 TIMES DAILY
Qty: 20 TABLET | Refills: 0 | Status: SHIPPED | OUTPATIENT
Start: 2019-10-01 | End: 2019-10-11

## 2019-10-01 NOTE — PROGRESS NOTES
CHIEF COMPLAINT:   Patient presents with:  URI: since Saturday night (9/28/19)      HPI:   Felicita Speaker is a 30year old female who presents for upper respiratory symptoms for  5 days.  Patient reports sore throat, congestion, green colored nasal discharge, and frontal sinuses  EYES: conjunctiva clear, EOM intact  EARS: TM's translucent, no bulging, no retraction,+ fluid, bony landmarks visible  NOSE: Nostrils patent, purulent nasal discharge, nasal mucosa edematous  THROAT: Oral mucosa pink, moist. Posterior

## 2019-12-31 ENCOUNTER — APPOINTMENT (OUTPATIENT)
Dept: CT IMAGING | Facility: HOSPITAL | Age: 29
End: 2019-12-31
Attending: EMERGENCY MEDICINE
Payer: COMMERCIAL

## 2019-12-31 ENCOUNTER — HOSPITAL ENCOUNTER (EMERGENCY)
Facility: HOSPITAL | Age: 29
Discharge: HOME | End: 2019-12-31
Attending: EMERGENCY MEDICINE
Payer: COMMERCIAL

## 2019-12-31 VITALS
HEART RATE: 96 BPM | HEIGHT: 62 IN | TEMPERATURE: 98 F | RESPIRATION RATE: 20 BRPM | WEIGHT: 240 LBS | BODY MASS INDEX: 44.16 KG/M2 | SYSTOLIC BLOOD PRESSURE: 119 MMHG | DIASTOLIC BLOOD PRESSURE: 79 MMHG | OXYGEN SATURATION: 99 %

## 2019-12-31 DIAGNOSIS — R10.13 EPIGASTRIC PAIN: Primary | ICD-10-CM

## 2019-12-31 LAB
ALBUMIN SERPL-MCNC: 3.2 G/DL (ref 3.4–5)
ALBUMIN/GLOB SERPL: 0.7 {RATIO} (ref 1–2)
ALP LIVER SERPL-CCNC: 59 U/L (ref 37–98)
ALT SERPL-CCNC: 19 U/L (ref 13–56)
ANION GAP SERPL CALC-SCNC: 6 MMOL/L (ref 0–18)
AST SERPL-CCNC: 17 U/L (ref 15–37)
BASOPHILS # BLD AUTO: 0.02 X10(3) UL (ref 0–0.2)
BASOPHILS NFR BLD AUTO: 0.3 %
BILIRUB SERPL-MCNC: 0.4 MG/DL (ref 0.1–2)
BILIRUB UR QL STRIP.AUTO: NEGATIVE
BUN BLD-MCNC: 13 MG/DL (ref 7–18)
BUN/CREAT SERPL: 19.1 (ref 10–20)
CALCIUM BLD-MCNC: 8.5 MG/DL (ref 8.5–10.1)
CHLORIDE SERPL-SCNC: 105 MMOL/L (ref 98–112)
CO2 SERPL-SCNC: 23 MMOL/L (ref 21–32)
COLOR UR AUTO: YELLOW
CREAT BLD-MCNC: 0.68 MG/DL (ref 0.55–1.02)
DEPRECATED RDW RBC AUTO: 42 FL (ref 35.1–46.3)
EOSINOPHIL # BLD AUTO: 0.03 X10(3) UL (ref 0–0.7)
EOSINOPHIL NFR BLD AUTO: 0.5 %
ERYTHROCYTE [DISTWIDTH] IN BLOOD BY AUTOMATED COUNT: 12.7 % (ref 11–15)
GLOBULIN PLAS-MCNC: 4.3 G/DL (ref 2.8–4.4)
GLUCOSE BLD-MCNC: 96 MG/DL (ref 70–99)
GLUCOSE UR STRIP.AUTO-MCNC: NEGATIVE MG/DL
HCT VFR BLD AUTO: 38.6 % (ref 35–48)
HGB BLD-MCNC: 12.5 G/DL (ref 12–16)
IMM GRANULOCYTES # BLD AUTO: 0.02 X10(3) UL (ref 0–1)
IMM GRANULOCYTES NFR BLD: 0.3 %
KETONES UR STRIP.AUTO-MCNC: 20 MG/DL
LIPASE SERPL-CCNC: 63 U/L (ref 73–393)
LYMPHOCYTES # BLD AUTO: 1.1 X10(3) UL (ref 1–4)
LYMPHOCYTES NFR BLD AUTO: 17.7 %
M PROTEIN MFR SERPL ELPH: 7.5 G/DL (ref 6.4–8.2)
MCH RBC QN AUTO: 29.7 PG (ref 26–34)
MCHC RBC AUTO-ENTMCNC: 32.4 G/DL (ref 31–37)
MCV RBC AUTO: 91.7 FL (ref 80–100)
MONOCYTES # BLD AUTO: 0.54 X10(3) UL (ref 0.1–1)
MONOCYTES NFR BLD AUTO: 8.7 %
NEUTROPHILS # BLD AUTO: 4.51 X10 (3) UL (ref 1.5–7.7)
NEUTROPHILS # BLD AUTO: 4.51 X10(3) UL (ref 1.5–7.7)
NEUTROPHILS NFR BLD AUTO: 72.5 %
NITRITE UR QL STRIP.AUTO: NEGATIVE
OSMOLALITY SERPL CALC.SUM OF ELEC: 278 MOSM/KG (ref 275–295)
PH UR STRIP.AUTO: 5 [PH] (ref 4.5–8)
PLATELET # BLD AUTO: 224 10(3)UL (ref 150–450)
POCT LOT NUMBER: NORMAL
POCT URINE PREGNANCY: NEGATIVE
POTASSIUM SERPL-SCNC: 3.3 MMOL/L (ref 3.5–5.1)
PROT UR STRIP.AUTO-MCNC: NEGATIVE MG/DL
RBC # BLD AUTO: 4.21 X10(6)UL (ref 3.8–5.3)
SODIUM SERPL-SCNC: 134 MMOL/L (ref 136–145)
SP GR UR STRIP.AUTO: 1.03 (ref 1–1.03)
UROBILINOGEN UR STRIP.AUTO-MCNC: <2 MG/DL
WBC # BLD AUTO: 6.2 X10(3) UL (ref 4–11)

## 2019-12-31 PROCEDURE — 96361 HYDRATE IV INFUSION ADD-ON: CPT

## 2019-12-31 PROCEDURE — 99284 EMERGENCY DEPT VISIT MOD MDM: CPT

## 2019-12-31 PROCEDURE — 83690 ASSAY OF LIPASE: CPT | Performed by: EMERGENCY MEDICINE

## 2019-12-31 PROCEDURE — 85025 COMPLETE CBC W/AUTO DIFF WBC: CPT | Performed by: EMERGENCY MEDICINE

## 2019-12-31 PROCEDURE — 87086 URINE CULTURE/COLONY COUNT: CPT | Performed by: EMERGENCY MEDICINE

## 2019-12-31 PROCEDURE — 80053 COMPREHEN METABOLIC PANEL: CPT | Performed by: EMERGENCY MEDICINE

## 2019-12-31 PROCEDURE — 81001 URINALYSIS AUTO W/SCOPE: CPT | Performed by: EMERGENCY MEDICINE

## 2019-12-31 PROCEDURE — 74177 CT ABD & PELVIS W/CONTRAST: CPT | Performed by: EMERGENCY MEDICINE

## 2019-12-31 PROCEDURE — 81025 URINE PREGNANCY TEST: CPT

## 2019-12-31 PROCEDURE — 96360 HYDRATION IV INFUSION INIT: CPT

## 2019-12-31 RX ORDER — ONDANSETRON 4 MG/1
4 TABLET, ORALLY DISINTEGRATING ORAL EVERY 4 HOURS PRN
Qty: 10 TABLET | Refills: 0 | Status: SHIPPED | OUTPATIENT
Start: 2019-12-31 | End: 2020-01-07

## 2019-12-31 RX ORDER — OMEPRAZOLE 20 MG/1
20 CAPSULE, DELAYED RELEASE ORAL DAILY
Qty: 30 CAPSULE | Refills: 0 | Status: SHIPPED | OUTPATIENT
Start: 2019-12-31 | End: 2020-01-23

## 2019-12-31 RX ORDER — DICYCLOMINE HCL 20 MG
20 TABLET ORAL 4 TIMES DAILY PRN
Qty: 30 TABLET | Refills: 0 | Status: SHIPPED | OUTPATIENT
Start: 2019-12-31 | End: 2020-01-23

## 2019-12-31 NOTE — ED INITIAL ASSESSMENT (HPI)
31 yo F female complaining of epigastric pain since yesterday, some relief with food and peptobismol. + nausea, no vomiting, dark stools today. Had cholecystecomy 2013.

## 2020-01-01 NOTE — ED PROVIDER NOTES
Patient Seen in: [facility] Emergency Department      History   Patient presents with:  Abdomen/Flank Pain    Stated Complaint: abd pain    HPI    Patient is a 28-year-old female who is status post cholecystectomy 6 years ago, presented with epigast atraumatic. Eyes:      Conjunctiva/sclera: Conjunctivae normal.      Pupils: Pupils are equal, round, and reactive to light. Neck:      Musculoskeletal: Normal range of motion and neck supple.    Cardiovascular:      Rate and Rhythm: Normal rate and reg tests on the individual orders.    POCT PREGNANCY, URINE   RAINBOW DRAW BLUE   RAINBOW DRAW LAVENDER   RAINBOW DRAW LIGHT GREEN   RAINBOW DRAW GOLD   URINE CULTURE, ROUTINE   CBC W/ DIFFERENTIAL          Ct Abdomen+pelvis(contrast Only)(cpt=74177)    Result

## 2020-01-23 ENCOUNTER — OFFICE VISIT (OUTPATIENT)
Dept: INTERNAL MEDICINE CLINIC | Facility: CLINIC | Age: 30
End: 2020-01-23
Payer: COMMERCIAL

## 2020-01-23 VITALS
HEART RATE: 109 BPM | TEMPERATURE: 98 F | WEIGHT: 249.5 LBS | OXYGEN SATURATION: 99 % | HEIGHT: 62 IN | DIASTOLIC BLOOD PRESSURE: 88 MMHG | BODY MASS INDEX: 45.91 KG/M2 | SYSTOLIC BLOOD PRESSURE: 129 MMHG | RESPIRATION RATE: 18 BRPM

## 2020-01-23 DIAGNOSIS — J01.00 ACUTE NON-RECURRENT MAXILLARY SINUSITIS: Primary | ICD-10-CM

## 2020-01-23 PROCEDURE — 99212 OFFICE O/P EST SF 10 MIN: CPT | Performed by: NURSE PRACTITIONER

## 2020-01-23 NOTE — PROGRESS NOTES
CHIEF COMPLAINT:   Patient presents with:  Sinus Problem      HPI:   Kwasi Barrera is a 30year old female who presents for upper respiratory symptoms for  2 days.  Patient reports sore throat, congestion, cough with brown/green colored sputum, ear pain, pain visible  NOSE: Nostrils patent, yellow nasal discharge, nasal mucosa erythematic  THROAT: Oral mucosa pink, moist. Posterior pharynx is not erythematous. + PND exudates.  Tonsils 1+  NECK: Supple, non-tender  LUNGS: clear to auscultation bilaterally, no whe

## 2020-01-27 ENCOUNTER — TELEPHONE (OUTPATIENT)
Dept: INTERNAL MEDICINE CLINIC | Facility: CLINIC | Age: 30
End: 2020-01-27

## 2020-01-27 RX ORDER — AZITHROMYCIN 250 MG/1
250 TABLET, FILM COATED ORAL DAILY
Qty: 6 TABLET | Refills: 0 | Status: SHIPPED | OUTPATIENT
Start: 2020-01-27 | End: 2020-07-17 | Stop reason: ALTCHOICE

## 2020-01-27 NOTE — TELEPHONE ENCOUNTER
Patient calling because she feels worse and Talisha told her to call if not feeling better this week; can she get an antibiotic?  Please callback to triage

## 2020-01-27 NOTE — TELEPHONE ENCOUNTER
Pt is no better sx's more intense,sinus pressure and pain ,coughing up green phlem and blowing her nose green plem . Has used otc medication without relief.  Requesting abx to [facility] in [place]

## 2020-07-17 ENCOUNTER — OFFICE VISIT (OUTPATIENT)
Dept: INTERNAL MEDICINE CLINIC | Facility: CLINIC | Age: 30
End: 2020-07-17
Payer: COMMERCIAL

## 2020-07-17 VITALS
HEART RATE: 113 BPM | WEIGHT: 261.25 LBS | BODY MASS INDEX: 48.07 KG/M2 | TEMPERATURE: 99 F | DIASTOLIC BLOOD PRESSURE: 76 MMHG | HEIGHT: 62 IN | SYSTOLIC BLOOD PRESSURE: 124 MMHG | RESPIRATION RATE: 16 BRPM | OXYGEN SATURATION: 98 %

## 2020-07-17 DIAGNOSIS — Z00.00 LABORATORY EXAMINATION ORDERED AS PART OF A ROUTINE GENERAL MEDICAL EXAMINATION: ICD-10-CM

## 2020-07-17 DIAGNOSIS — Z00.00 ROUTINE GENERAL MEDICAL EXAMINATION AT A HEALTH CARE FACILITY: Primary | ICD-10-CM

## 2020-07-17 PROBLEM — E66.01 MORBID OBESITY WITH BMI OF 45.0-49.9, ADULT (HCC): Status: ACTIVE | Noted: 2017-03-16

## 2020-07-17 PROCEDURE — 3078F DIAST BP <80 MM HG: CPT | Performed by: NURSE PRACTITIONER

## 2020-07-17 PROCEDURE — 3074F SYST BP LT 130 MM HG: CPT | Performed by: NURSE PRACTITIONER

## 2020-07-17 PROCEDURE — 3008F BODY MASS INDEX DOCD: CPT | Performed by: NURSE PRACTITIONER

## 2020-07-17 PROCEDURE — 99395 PREV VISIT EST AGE 18-39: CPT | Performed by: NURSE PRACTITIONER

## 2020-07-17 RX ORDER — CETIRIZINE HYDROCHLORIDE 10 MG/1
10 TABLET ORAL DAILY PRN
COMMUNITY
End: 2021-06-11 | Stop reason: ALTCHOICE

## 2020-07-17 NOTE — PROGRESS NOTES
Patient presents with:  Employment Physical       HPI:  No complaints, needs employment physical.     Annual Physical due on 05/31/2017  Pap Smear,3 Years due on 10/09/2018  Influenza Vaccine(1) due on 09/01/2020  Annual Depression Screen due on 07/17/2021 Tobacco Use      Smoking status: Never Smoker      Smokeless tobacco: Never Used    Substance and Sexual Activity      Alcohol use: Yes        Comment: Social      Drug use: No      Sexual activity: Never    Lifestyle      Physical activity:        Days pe or cramping. Regular stools. GYNE/GENITOURINARY: Denies dysuria, urgency or frequency; no hx abnormal pap smear; no vaginal discharge; no dyspareunia; periods regular; no urinary incontinence. MUSCULOSKELETAL: Denies arthralgias or myalgias.   NEURO: Madina Ramirez or edema. NEUROLOGIC: CN's II-XII grossly intact. Strength 5+/5+ x 4 ext. Alert and orientated. SKIN: no suspicions lesions noted. PSYCHIATRIC: Mood and affect appropriate. ASSESSMENT / PLAN:  1.  Routine general medical examination at a [facility]

## 2020-07-19 LAB
ABSOLUTE BASOPHILS: 20 CELLS/UL (ref 0–200)
ABSOLUTE EOSINOPHILS: 78 CELLS/UL (ref 15–500)
ABSOLUTE LYMPHOCYTES: 1588 CELLS/UL (ref 850–3900)
ABSOLUTE MONOCYTES: 539 CELLS/UL (ref 200–950)
ABSOLUTE NEUTROPHILS: 2675 CELLS/UL (ref 1500–7800)
ALBUMIN/GLOBULIN RATIO: 1.3 (CALC) (ref 1–2.5)
ALBUMIN: 3.9 G/DL (ref 3.6–5.1)
ALKALINE PHOSPHATASE: 52 U/L (ref 31–125)
ALT: 13 U/L (ref 6–29)
AST: 17 U/L (ref 10–30)
BASOPHILS: 0.4 %
BILIRUBIN, TOTAL: 0.3 MG/DL (ref 0.2–1.2)
BUN: 11 MG/DL (ref 7–25)
CALCIUM: 9.3 MG/DL (ref 8.6–10.2)
CARBON DIOXIDE: 27 MMOL/L (ref 20–32)
CHLORIDE: 102 MMOL/L (ref 98–110)
CHOL/HDLC RATIO: 3.2 (CALC)
CHOLESTEROL, TOTAL: 177 MG/DL
CREATININE: 0.61 MG/DL (ref 0.5–1.1)
EGFR IF AFRICN AM: 142 ML/MIN/1.73M2
EGFR IF NONAFRICN AM: 123 ML/MIN/1.73M2
EOSINOPHILS: 1.6 %
GLOBULIN: 3 G/DL (CALC) (ref 1.9–3.7)
GLUCOSE: 88 MG/DL (ref 65–99)
HDL CHOLESTEROL: 56 MG/DL
HEMATOCRIT: 38.9 % (ref 35–45)
HEMOGLOBIN A1C: 5.1 % OF TOTAL HGB
HEMOGLOBIN: 12.6 G/DL (ref 11.7–15.5)
LDL-CHOLESTEROL: 85 MG/DL (CALC)
LYMPHOCYTES: 32.4 %
MCH: 29 PG (ref 27–33)
MCHC: 32.4 G/DL (ref 32–36)
MCV: 89.4 FL (ref 80–100)
MONOCYTES: 11 %
MPV: 10.2 FL (ref 7.5–12.5)
NEUTROPHILS: 54.6 %
NON-HDL CHOLESTEROL: 121 MG/DL (CALC)
PLATELET COUNT: 265 THOUSAND/UL (ref 140–400)
POTASSIUM: 4.7 MMOL/L (ref 3.5–5.3)
PROTEIN, TOTAL: 6.9 G/DL (ref 6.1–8.1)
RDW: 12.5 % (ref 11–15)
RED BLOOD CELL COUNT: 4.35 MILLION/UL (ref 3.8–5.1)
SODIUM: 137 MMOL/L (ref 135–146)
TRIGLYCERIDES: 270 MG/DL
TSH: 1.96 MIU/L
VITAMIN D, 25-OH, TOTAL: 15 NG/ML (ref 30–100)
WHITE BLOOD CELL COUNT: 4.9 THOUSAND/UL (ref 3.8–10.8)

## 2020-07-22 ENCOUNTER — PATIENT MESSAGE (OUTPATIENT)
Dept: INTERNAL MEDICINE CLINIC | Facility: CLINIC | Age: 30
End: 2020-07-22

## 2020-07-23 NOTE — TELEPHONE ENCOUNTER
From: Bruno Pino  To: GAGANDEEP Soto  Sent: 7/22/2020 5:52 PM CDT  Subject: Prescription Question    I have a question about CBC WITH DIFFERENTIAL WITH PLATELET resulted on 7/19/20, 11:45 AM.    Which pharmacy did you send the prescription to?

## 2020-07-24 RX ORDER — ERGOCALCIFEROL 1.25 MG/1
50000 CAPSULE ORAL WEEKLY
Qty: 4 CAPSULE | Refills: 5 | Status: SHIPPED | OUTPATIENT
Start: 2020-07-24 | End: 2020-12-07

## 2020-10-06 ENCOUNTER — OFFICE VISIT (OUTPATIENT)
Dept: INTERNAL MEDICINE CLINIC | Facility: CLINIC | Age: 30
End: 2020-10-06
Payer: COMMERCIAL

## 2020-10-06 VITALS
OXYGEN SATURATION: 99 % | SYSTOLIC BLOOD PRESSURE: 128 MMHG | TEMPERATURE: 99 F | BODY MASS INDEX: 47.94 KG/M2 | RESPIRATION RATE: 16 BRPM | HEART RATE: 94 BPM | DIASTOLIC BLOOD PRESSURE: 70 MMHG | HEIGHT: 61.5 IN | WEIGHT: 257.19 LBS

## 2020-10-06 DIAGNOSIS — E66.01 CLASS 3 SEVERE OBESITY DUE TO EXCESS CALORIES WITHOUT SERIOUS COMORBIDITY WITH BODY MASS INDEX (BMI) OF 45.0 TO 49.9 IN ADULT (HCC): Primary | ICD-10-CM

## 2020-10-06 DIAGNOSIS — J32.8 OTHER CHRONIC SINUSITIS: ICD-10-CM

## 2020-10-06 DIAGNOSIS — E55.9 VITAMIN D DEFICIENCY: ICD-10-CM

## 2020-10-06 DIAGNOSIS — Z23 FLU VACCINE NEED: ICD-10-CM

## 2020-10-06 PROCEDURE — 3078F DIAST BP <80 MM HG: CPT | Performed by: INTERNAL MEDICINE

## 2020-10-06 PROCEDURE — 99214 OFFICE O/P EST MOD 30 MIN: CPT | Performed by: INTERNAL MEDICINE

## 2020-10-06 PROCEDURE — 90686 IIV4 VACC NO PRSV 0.5 ML IM: CPT | Performed by: INTERNAL MEDICINE

## 2020-10-06 PROCEDURE — 3074F SYST BP LT 130 MM HG: CPT | Performed by: INTERNAL MEDICINE

## 2020-10-06 PROCEDURE — 3008F BODY MASS INDEX DOCD: CPT | Performed by: INTERNAL MEDICINE

## 2020-10-06 PROCEDURE — 90471 IMMUNIZATION ADMIN: CPT | Performed by: INTERNAL MEDICINE

## 2020-10-06 RX ORDER — PHENTERMINE HYDROCHLORIDE 37.5 MG/1
37.5 TABLET ORAL
Qty: 30 TABLET | Refills: 1 | Status: SHIPPED | OUTPATIENT
Start: 2020-10-06 | End: 2020-12-07

## 2020-10-06 NOTE — PROGRESS NOTES
Patient presents with:  Weight Check      HPI: Shay Gutierrez presents today for eval of primarily Class 3 obesity but for other things as well:  1. Class 3 obesity - Formerly on Phentermine. She would like to get back on it.  Has had challenges with maintaining a l sinusitis  Vitamin d deficiency  Flu vaccine need    1. Class 3 obesity - Formerly on Phentermine. She would like to get back on it. Has had challenges with maintaining a lower body weight despite lifestyle efforts.  She did tolerate Phentermine well in the INFLUENZA VACCINE QUAD PRESERVATIVE FREE 0.5 ML  ENT - INTERNAL

## 2020-10-17 ENCOUNTER — APPOINTMENT (OUTPATIENT)
Dept: LAB | Age: 30
End: 2020-10-17
Attending: INTERNAL MEDICINE
Payer: COMMERCIAL

## 2020-10-17 PROCEDURE — 36415 COLL VENOUS BLD VENIPUNCTURE: CPT | Performed by: INTERNAL MEDICINE

## 2020-10-17 PROCEDURE — 82306 VITAMIN D 25 HYDROXY: CPT | Performed by: INTERNAL MEDICINE

## 2020-12-07 ENCOUNTER — TELEMEDICINE (OUTPATIENT)
Dept: INTERNAL MEDICINE CLINIC | Facility: CLINIC | Age: 30
End: 2020-12-07
Payer: COMMERCIAL

## 2020-12-07 DIAGNOSIS — E66.01 CLASS 3 SEVERE OBESITY DUE TO EXCESS CALORIES WITHOUT SERIOUS COMORBIDITY WITH BODY MASS INDEX (BMI) OF 45.0 TO 49.9 IN ADULT (HCC): ICD-10-CM

## 2020-12-07 PROCEDURE — 99214 OFFICE O/P EST MOD 30 MIN: CPT | Performed by: INTERNAL MEDICINE

## 2020-12-07 RX ORDER — PHENTERMINE HYDROCHLORIDE 37.5 MG/1
37.5 TABLET ORAL
Qty: 30 TABLET | Refills: 1 | Status: SHIPPED | OUTPATIENT
Start: 2020-12-07 | End: 2021-02-16

## 2020-12-07 NOTE — PROGRESS NOTES
Virtual Telephone Check-In    This visit is conducted using Telemedicine with live, interactive video and audio.  [place]   Patient understands and accepts financial responsibility for any deductible, co-insurance and/or co-pays associat medication refill of phentermine. Her weight is down to 250 lbs. She is tolerating the medication well.    ROS:  General: Feels well overall  Skin: Denies any unusual skin lesions  Eyes: Denies blurred vision or double vision  All systems negative, except f

## 2021-01-04 RX ORDER — ERGOCALCIFEROL 1.25 MG/1
50000 CAPSULE ORAL WEEKLY
Qty: 4 CAPSULE | Refills: 0 | OUTPATIENT
Start: 2021-01-04

## 2021-02-16 ENCOUNTER — TELEMEDICINE (OUTPATIENT)
Dept: INTERNAL MEDICINE CLINIC | Facility: CLINIC | Age: 31
End: 2021-02-16
Payer: COMMERCIAL

## 2021-02-16 VITALS
BODY MASS INDEX: 45.06 KG/M2 | OXYGEN SATURATION: 100 % | SYSTOLIC BLOOD PRESSURE: 126 MMHG | WEIGHT: 241.75 LBS | HEIGHT: 61.5 IN | DIASTOLIC BLOOD PRESSURE: 84 MMHG | HEART RATE: 102 BPM | RESPIRATION RATE: 16 BRPM | TEMPERATURE: 98 F

## 2021-02-16 DIAGNOSIS — Z79.899 ENCOUNTER FOR MEDICATION MANAGEMENT: Primary | ICD-10-CM

## 2021-02-16 DIAGNOSIS — E66.01 CLASS 3 SEVERE OBESITY DUE TO EXCESS CALORIES WITHOUT SERIOUS COMORBIDITY WITH BODY MASS INDEX (BMI) OF 45.0 TO 49.9 IN ADULT (HCC): ICD-10-CM

## 2021-02-16 PROCEDURE — 3074F SYST BP LT 130 MM HG: CPT | Performed by: NURSE PRACTITIONER

## 2021-02-16 PROCEDURE — 3008F BODY MASS INDEX DOCD: CPT | Performed by: NURSE PRACTITIONER

## 2021-02-16 PROCEDURE — 3079F DIAST BP 80-89 MM HG: CPT | Performed by: NURSE PRACTITIONER

## 2021-02-16 PROCEDURE — 93000 ELECTROCARDIOGRAM COMPLETE: CPT | Performed by: NURSE PRACTITIONER

## 2021-02-16 PROCEDURE — 99213 OFFICE O/P EST LOW 20 MIN: CPT | Performed by: NURSE PRACTITIONER

## 2021-02-16 RX ORDER — PHENTERMINE HYDROCHLORIDE 37.5 MG/1
37.5 TABLET ORAL
Qty: 30 TABLET | Refills: 1 | Status: SHIPPED | OUTPATIENT
Start: 2021-02-16 | End: 2021-04-12

## 2021-02-16 RX ORDER — MAG HYDROX/ALUMINUM HYD/SIMETH 400-400-40
5000 SUSPENSION, ORAL (FINAL DOSE FORM) ORAL DAILY
COMMUNITY

## 2021-02-16 NOTE — PROGRESS NOTES
CHIEF COMPLAINT:     Patient presents with:  Weight Check      HPI:   Mariama Talbert is a 35year old female Here to f/u for phentermine. Pt started medication October 2020.  Pt has lost 16 lbs since last in office visit and total. Pt denies any shortness of b /84 (BP Location: Left arm, Patient Position: Sitting, Cuff Size: large)   Pulse 102   Temp 98.3 °F (36.8 °C) (Oral)   Resp 16   Ht 5' 1.5\" (1.562 m)   Wt 241 lb 12 oz (109.7 kg)   LMP 02/11/2021 (Exact Date)   SpO2 100%   Breastfeeding No   BMI 4

## 2021-03-13 DIAGNOSIS — Z23 NEED FOR VACCINATION: ICD-10-CM

## 2021-03-20 ENCOUNTER — HOSPITAL ENCOUNTER (OUTPATIENT)
Age: 31
Discharge: HOME | End: 2021-03-20
Payer: COMMERCIAL

## 2021-03-20 VITALS
OXYGEN SATURATION: 99 % | TEMPERATURE: 99 F | HEIGHT: 62 IN | DIASTOLIC BLOOD PRESSURE: 103 MMHG | RESPIRATION RATE: 16 BRPM | HEART RATE: 102 BPM | WEIGHT: 235 LBS | BODY MASS INDEX: 43.24 KG/M2 | SYSTOLIC BLOOD PRESSURE: 140 MMHG

## 2021-03-20 DIAGNOSIS — S39.012A STRAIN OF LUMBAR REGION, INITIAL ENCOUNTER: Primary | ICD-10-CM

## 2021-03-20 LAB
POCT BILIRUBIN URINE: NEGATIVE
POCT GLUCOSE URINE: NEGATIVE MG/DL
POCT KETONE URINE: NEGATIVE MG/DL
POCT NITRITE URINE: NEGATIVE
POCT PH URINE: 6.5 (ref 5–8)
POCT PROTEIN URINE: NEGATIVE MG/DL
POCT SPECIFIC GRAVITY URINE: 1.02
POCT URINE CLARITY: CLEAR
POCT URINE COLOR: YELLOW
POCT URINE PREGNANCY: NEGATIVE
POCT UROBILINOGEN URINE: 0.2 MG/DL

## 2021-03-20 PROCEDURE — 81002 URINALYSIS NONAUTO W/O SCOPE: CPT

## 2021-03-20 PROCEDURE — 81025 URINE PREGNANCY TEST: CPT

## 2021-03-20 PROCEDURE — 99213 OFFICE O/P EST LOW 20 MIN: CPT

## 2021-03-20 RX ORDER — METHYLPREDNISOLONE 4 MG/1
TABLET ORAL
Qty: 1 PACKAGE | Refills: 0 | Status: SHIPPED | OUTPATIENT
Start: 2021-03-20 | End: 2021-04-12 | Stop reason: ALTCHOICE

## 2021-03-20 RX ORDER — CYCLOBENZAPRINE HCL 5 MG
TABLET ORAL 3 TIMES DAILY PRN
Qty: 20 TABLET | Refills: 0 | Status: SHIPPED | OUTPATIENT
Start: 2021-03-20 | End: 2021-06-11 | Stop reason: ALTCHOICE

## 2021-03-20 NOTE — ED INITIAL ASSESSMENT (HPI)
Pt with lower back and buttock pain since 2/22/21, worsens with bending down - had done a lot of heavy lifting the day before pain started; +covid 3/1/21    No fever/vom/dysuria/hematuria

## 2021-03-20 NOTE — ED PROVIDER NOTES
Patient Seen in: [facility]      History   Patient presents with:  Back Pain    Stated Complaint: lower back pain since 2/21    HPI/Subjective:   HPI    Efrain Camilo is a 78-year-old female who presents today for evaluation of right-sided lower stated complaint: lower back pain since 2/21  Other systems are as noted in HPI. Constitutional and vital signs reviewed. All other systems reviewed and negative except as noted above.     Physical Exam     ED Triage Vitals [03/20/21 1103]   BP (!) 14 patient is currently menstruating and small amount of leukocyte Estrace, likely contamination. Patient is not complaining of any dysuria. She has been trying NSAIDs without much relief.   Plan is to discharge her home on a Medrol Dosepak and a muscle rela

## 2021-04-12 ENCOUNTER — OFFICE VISIT (OUTPATIENT)
Dept: INTERNAL MEDICINE CLINIC | Facility: CLINIC | Age: 31
End: 2021-04-12
Payer: COMMERCIAL

## 2021-04-12 VITALS
HEART RATE: 104 BPM | SYSTOLIC BLOOD PRESSURE: 120 MMHG | RESPIRATION RATE: 16 BRPM | HEIGHT: 62 IN | BODY MASS INDEX: 43.46 KG/M2 | TEMPERATURE: 98 F | OXYGEN SATURATION: 99 % | WEIGHT: 236.19 LBS | DIASTOLIC BLOOD PRESSURE: 86 MMHG

## 2021-04-12 DIAGNOSIS — E66.01 MORBID OBESITY WITH BMI OF 45.0-49.9, ADULT (HCC): Primary | ICD-10-CM

## 2021-04-12 PROCEDURE — 3079F DIAST BP 80-89 MM HG: CPT | Performed by: INTERNAL MEDICINE

## 2021-04-12 PROCEDURE — 3008F BODY MASS INDEX DOCD: CPT | Performed by: INTERNAL MEDICINE

## 2021-04-12 PROCEDURE — 99213 OFFICE O/P EST LOW 20 MIN: CPT | Performed by: INTERNAL MEDICINE

## 2021-04-12 PROCEDURE — 3074F SYST BP LT 130 MM HG: CPT | Performed by: INTERNAL MEDICINE

## 2021-04-12 RX ORDER — PHENTERMINE HYDROCHLORIDE 37.5 MG/1
37.5 TABLET ORAL
Qty: 30 TABLET | Refills: 1 | Status: SHIPPED | OUTPATIENT
Start: 2021-04-12 | End: 2021-06-11

## 2021-04-12 NOTE — PROGRESS NOTES
Patient Office Visit    ASSESSMENT AND PLAN:   (E66.01,  G75.08) Morbid obesity with BMI of 45.0-49.9, adult [facility]  (primary encounter diagnosis)  Plan: Phentermine HCl 37.5 MG Oral Tab  Note: doing well on medications.  Continue to exercise at least 150 mi MD at [facility] MAIN OR   • LITHOTRIPSY     • OTHER SURGICAL HISTORY  2008    Keller teeth   • TONSILLECTOMY  2002       Social History:  Social History    Tobacco Use      Smoking status: Never Smoker      Smokeless tobacco: Never Used    Vaping Use      Vaping weakness, no numbness, normal gait   Hematological:  no bruises or bleeding   Psychiatric/Behavioral: normal mood no anxiety normal behavior     /86 (BP Location: Left arm, Patient Position: Sitting, Cuff Size: adult)   Pulse 104   Temp 97.9 °F (36.6

## 2021-04-28 ENCOUNTER — APPOINTMENT (OUTPATIENT)
Dept: GENERAL RADIOLOGY | Age: 31
End: 2021-04-28
Attending: NURSE PRACTITIONER
Payer: COMMERCIAL

## 2021-04-28 ENCOUNTER — HOSPITAL ENCOUNTER (OUTPATIENT)
Age: 31
Discharge: HOME | End: 2021-04-28
Payer: COMMERCIAL

## 2021-04-28 VITALS
BODY MASS INDEX: 43.24 KG/M2 | HEIGHT: 62 IN | SYSTOLIC BLOOD PRESSURE: 126 MMHG | WEIGHT: 235 LBS | DIASTOLIC BLOOD PRESSURE: 84 MMHG | HEART RATE: 84 BPM | TEMPERATURE: 98 F | RESPIRATION RATE: 16 BRPM | OXYGEN SATURATION: 100 %

## 2021-04-28 DIAGNOSIS — M54.50 LUMBAR PAIN: Primary | ICD-10-CM

## 2021-04-28 PROCEDURE — 99213 OFFICE O/P EST LOW 20 MIN: CPT

## 2021-04-28 PROCEDURE — 72110 X-RAY EXAM L-2 SPINE 4/>VWS: CPT | Performed by: NURSE PRACTITIONER

## 2021-04-28 NOTE — ED INITIAL ASSESSMENT (HPI)
Patient reports she had lower back pain in February after moving furniture. Patient reports she is still having back pain. Patient reports the pain is now more centralized into the spine.

## 2021-04-29 ENCOUNTER — TELEPHONE (OUTPATIENT)
Dept: SURGERY | Facility: CLINIC | Age: 31
End: 2021-04-29

## 2021-04-29 NOTE — ED PROVIDER NOTES
Patient Seen in: [facility]      History   Patient presents with:  Back Pain    Stated Complaint: lower back pain    HPI/Subjective:   HPI  25-year-old female presents to the immediate care complaining of lower back pain for the past few 2\")   Wt 106.6 kg   LMP 04/07/2021 (Approximate)   SpO2 100%   BMI 42.98 kg/m²         Physical Exam  Vitals and nursing note reviewed. Constitutional:       Appearance: Normal appearance. She is well-developed.    Cardiovascular:      Rate and Rhythm: N Finalized by (CST): Dave Nur MD on 4/28/2021 at 7:12 PM              MDM    X-ray shows degenerative changes of the lumbar spine. Patient with no neuro deficit.   Patient instructed to use topical pain patches along with Tylenol and Motrin with fo

## 2021-05-04 ENCOUNTER — OFFICE VISIT (OUTPATIENT)
Dept: SURGERY | Facility: CLINIC | Age: 31
End: 2021-05-04
Payer: COMMERCIAL

## 2021-05-04 ENCOUNTER — TELEPHONE (OUTPATIENT)
Dept: PHYSICAL THERAPY | Facility: HOSPITAL | Age: 31
End: 2021-05-04

## 2021-05-04 VITALS
WEIGHT: 235 LBS | BODY MASS INDEX: 43.24 KG/M2 | HEIGHT: 62 IN | SYSTOLIC BLOOD PRESSURE: 126 MMHG | DIASTOLIC BLOOD PRESSURE: 82 MMHG

## 2021-05-04 DIAGNOSIS — M54.50 ACUTE BILATERAL LOW BACK PAIN WITHOUT SCIATICA: ICD-10-CM

## 2021-05-04 DIAGNOSIS — M47.816 LUMBAR SPONDYLOSIS: Primary | ICD-10-CM

## 2021-05-04 PROCEDURE — 3079F DIAST BP 80-89 MM HG: CPT | Performed by: PHYSICIAN ASSISTANT

## 2021-05-04 PROCEDURE — 3008F BODY MASS INDEX DOCD: CPT | Performed by: PHYSICIAN ASSISTANT

## 2021-05-04 PROCEDURE — 99204 OFFICE O/P NEW MOD 45 MIN: CPT | Performed by: PHYSICIAN ASSISTANT

## 2021-05-04 PROCEDURE — 3074F SYST BP LT 130 MM HG: CPT | Performed by: PHYSICIAN ASSISTANT

## 2021-05-04 RX ORDER — MONTELUKAST SODIUM 10 MG/1
10 TABLET ORAL DAILY
COMMUNITY
Start: 2021-04-20

## 2021-05-04 RX ORDER — METHYLPREDNISOLONE 4 MG/1
TABLET ORAL
Qty: 1 PACKAGE | Refills: 0 | Status: SHIPPED | OUTPATIENT
Start: 2021-05-04 | End: 2021-06-11 | Stop reason: ALTCHOICE

## 2021-05-04 NOTE — PATIENT INSTRUCTIONS
Hospital Medicine History & Physical      PCP: [facility]    Date of Service: Pt seen/examined on 12/22/18 and admitted on 12/22/18 to Inpatient    Chief Complaint   Patient presents with    Altered Mental Status     pt's family member called EMS for unresponsive; EMS started a line and medicated with narcan 6 mg;        History Of Present Illness: The patient is a 68 y.o. female with PMH below, presents with MS change. No additional info available at this time at pt is intubated and sedated. No family at bedside. The following was written by Dr. Taryn Terrazas earlier this evening. Info was reportedly obtained from EMS. Morro Jane is a 68 y.o. female brought in by EMS after they were called secondary to a female unresponsive. EMS reports that the family was concerned because she was somnolent. When they arrived she was hypoxic into the 70s. She apparently earlier today had a sedated genital exam along with endometrial biopsy. She subsequently was given pain medication at home. He has provided the patient with 6 mg of Narcan with minimal change in her mentation. She required oxygen. Blood sugar was apparently normal.  No family has arrived. I am only able to obtain history from the limited chart at [facility] from earlier today, as well as EMS report. Past Medical History:        Diagnosis Date    Arthritis     Asthma     COPD (chronic obstructive pulmonary disease) ([facility] [address]     Diabetes mellitus ([facility] [address]     Hyperlipidemia     Hypertension     Other disorders of kidney and ureter in diseases classified elsewhere     Thyroid disease    No additional info available at this time at pt is intubated and sedated. No family at bedside.     Past Surgical History:        Procedure Laterality Date    CHOLECYSTECTOMY      ROTATOR CUFF REPAIR Left 2015    THYROID LOBECTOMY      XR KNEE INC TUNNEL BILAT Bilateral 2014   No additional info available at this time at pt is intubated and Refill policies:    • Allow 2-3 business days for refills; controlled substances may take longer.   • Contact your pharmacy at least 5 days prior to running out of medication and have them send an electronic request or submit request through the “request re Depending on your insurance carrier, approval may take 3-10 days. It is highly recommended patients contact their insurance carrier directly to determine coverage.   If test is done without insurance authorization, patient may be responsible for the entire sedated. No family at bedside. Medications Prior to Admission:    Prior to Admission medications    Medication Sig Start Date End Date Taking? Authorizing Provider   hydrocortisone 2.5 % cream Apply topically 2 times daily. 11/8/18   Daren Mcadams PA-C   phosphorus (PHOSPHA 250 NEUTRAL) [phone] MG tablet Take 1 tablet by mouth 2 times daily 5/16/18   Mirna Guan MD   rosuvastatin (CRESTOR) 40 MG tablet Take 40 mg by mouth every evening    Historical Provider, MD   montelukast (SINGULAIR) 10 MG tablet Take 10 mg by mouth nightly    Historical Provider, MD   magnesium oxide (MAG-OX) 400 MG tablet Take 400 mg by mouth 2 times daily    Historical Provider, MD   losartan (COZAAR) 25 MG tablet Take 25 mg by mouth daily    Historical Provider, MD   fluticasone (FLONASE) 50 MCG/ACT nasal spray 2 sprays by Nasal route daily    Historical Provider, MD   acetaminophen (TYLENOL) 325 MG tablet Take 650 mg by mouth every 6 hours as needed for Pain    Historical Provider, MD   fenofibrate 160 MG tablet Take 160 mg by mouth daily    Historical Provider, MD   furosemide (LASIX) 20 MG tablet Take 20 mg by mouth 2 times daily    Historical Provider, MD   PROAIR  (90 BASE) MCG/ACT inhaler 2 puffs every 6 hours as needed for Wheezing or Shortness of Breath  12/8/14   Historical Provider, MD   benzonatate (TESSALON) 100 MG capsule  12/12/14   Historical Provider, MD   docusate sodium (COLACE) 100 MG capsule Take 100 mg by mouth 2 times daily as needed  11/24/14   Historical Provider, MD   levothyroxine (SYNTHROID) 88 MCG tablet  12/16/14   Historical Provider, MD   oxybutynin (DITROPAN-XL) 10 MG CR tablet  11/3/14   Historical Provider, MD   Spacer/Aero-Holding Chambers ([address] [facility]) [address] [facility]  12/12/14   Historical Provider, MD   No additional info available at this time at pt is intubated and sedated. No family at bedside. The above med rec is likely wrong. Allergies:  Aspirin; Celecoxib;  Fexofenadine; not improving recommend MRI of the lumbar spine  She can work as tolerated recommend she try and keep her lifting to a minimum of maybe 20 pounds if she needs a letter she can let us know  Follow-up in 4 weeks with an MRI or if she is doing well as needed Contrast?->None Ordering Physician Provided Reason for Exam: Abdominal pain; AMS, uterine biopsy today, endometrial cancer Acuity: Acute Type of Exam: Initial Relevant Medical/Surgical History: htn dm cholecystectomy FINDINGS: Lower Chest: Bibasilar opacities are identified likely atelectasis/scarring although infectious process is possible in the proper clinical setting. Heart is enlarged without pericardial fluid collection. Organs: Status post cholecystectomy. No intrahepatic or extrahepatic biliary ductal dilatation. Pancreas and spleen are grossly unremarkable. GI/Bowel: Diffuse colonic diverticulosis without evidence for acute diverticulitis. Appendix is unremarkable. Small bowel loops are nondilated. No bowel obstruction or inflammation. No free intraperitoneal air or fluid. Pelvis:  No evidence for free fluid. Uterus is absent. Peritoneum/Retroperitoneum: The adrenal glands are normal in size and configuration bilaterally. Multiple bilateral renal cysts are identified. 7 cm hyperdense presumed cystic lesion arising from the lower pole the right kidney. Ureters are unobstructed. Urinary bladder is decompressed by Whelan catheter. . Bones/Soft Tissues: Multilevel degenerative disc disease and vacuum disc formation within the mid to lower lumbar spine. Vasculature: The abdominal aorta is normal in caliber. No discrete and aneurysm or dissection. No lymphadenopathy within the abdomen or pelvis. No evidence for acute intra-abdominal or intrapelvic pathology. No bowel obstruction or inflammation. No free intraperitoneal air or fluid. No evidence for urinary obstruction. Colonic diverticulosis without evidence for acute diverticulitis. Hyperdense cystic lesion arising from the lower pole the right kidney, measuring 7 cm. If not previously worked up, renal ultrasound recommended on a nonemergent/outpatient basis. Bibasilar airspace opacities favor atelectasis/scarring.  Infectious/inflammatory conductionNonspecific ST abnormalityAbnormal ECGWhen compared with ECG of 10-MAY-2018 09:12,Premature ventricular complexes are no longer PresentAberrant conduction is now PresentInverted T waves have replaced nonspecific T wave abnormality in Inferior leads         CBC:  Recent Labs      12/21/18 2207   WBC  13.8*   HGB  12.8   HCT  40.4   PLT  237      RENAL  Recent Labs      12/21/18 2207   NA  140   K  4.5   CL  98*   CO2  25   BUN  25*   CREATININE  1.2   GLUCOSE  246*     Hemoglobin a1c:  Lab Results   Component Value Date    LABA1C 6.4 05/10/2018     LFT'S:  Recent Labs      12/21/18 2207   AST  69*   ALT  41*   BILITOT  0.5   ALKPHOS  85     COAG:  Recent Labs      12/21/18 2207   INR  1.11     CARDIAC ENZYMES:   Recent Labs      12/22/18 0145   TROPONINI  <0.01     Lab Results   Component Value Date    PROBNP 1,015 (H) 05/10/2018    PROBNP 66 03/13/2016     LACTIC ACID:  Recent Labs      12/21/18 2207   LACTSEPSIS  4.4*     U/A:  Recent Labs      12/21/18 2207   LEUKOCYTESUR  Negative   BACTERIA  1+*   WBCUA  6-10*   COLORU  DK YELLOW   RBCUA  5-10*   CLARITYU  CLOUDY*   SPECGRAV  >=1.030   BLOODU  MODERATE*   GLUCOSEU  Negative   AMORPHOUS  2+*     Urine Tox Screen:  Recent Labs      12/21/18 2207   LABAMPH  Neg   BARBSCNU  Neg   LABBENZ  Neg   CANSU  Neg   COCAIMETSCRU  Neg   OPIATESCREENURINE  POSITIVE*   PHENCYCLIDINESCREENURINE  Neg   LABMETH  Neg   PROPOX  Neg   PHUR  5.0  5.0   OXYCODONEUR  POSITIVE*     ABG:   Recent Labs      12/21/18 2130   PHART  7.217*   PHI2WLL  65.7*   PO2ART  111.4*   JMR0EHR  26.1   M7HYBNEK  97.0   UOF7IAI  28.1   BEART  -2.9     PHYSICIAN CERTIFICATION    I certify that Nela Miller is expected to be hospitalized for 2 midnights based on the following assessment and plan:    ASSESSMENT/PLAN:  1. Acute respiratory failure with hypoxia and hypercapnia, likely related to polypharmacy, supplemental O2 to maintain SPO2 ? 92%, continuous pulse ox.    Pt

## 2021-05-04 NOTE — PROGRESS NOTES
[facility] Consultation      HISTORY OF PRESENT Filomena Waite is a 35year old RH female gives a history of having eye standing in February and did not move a lot of furniture.   In the process of moving furniture she has some right lower back grandmother. SOCIAL HISTORY:   reports that she has never smoked. She has never used smokeless tobacco. She reports current alcohol use. She reports that she does not use drugs.     ALLERGIES:    Latex                   RASH    Comment:RASH FROM LATEX GL again 5 lumbar type vertebrae spondylosis at L5-S1.    ASSESSMENT:  L5-S1 spondylosis with axial back pain possible facet joint pain  Possible lumbar herniation    PLAN:  Second Medrol Dosepak followed by over-the-counter NSAIDs twice daily for 2 weeks  Ph

## 2021-05-04 NOTE — PROGRESS NOTES
Feeling pretty good, using BIOFREEZE and lidocaine patches, they are helping. But would like to see why she is in pain. When first started, had a pulled muscle low back, and went down into buttocks.   Then dx with COVID, wasn't able to take NSAIDs  Was

## 2021-05-05 ENCOUNTER — TELEPHONE (OUTPATIENT)
Dept: NEUROLOGY | Facility: CLINIC | Age: 31
End: 2021-05-05

## 2021-05-05 NOTE — TELEPHONE ENCOUNTER
Both order and referral state patient is to go to [facility]. Please place an [facility] referral for patient. Thank you.

## 2021-05-10 ENCOUNTER — TELEPHONE (OUTPATIENT)
Dept: SURGERY | Facility: CLINIC | Age: 31
End: 2021-05-10

## 2021-05-10 NOTE — TELEPHONE ENCOUNTER
Pt calling to let Araceli Paz know that she cannot get into PT before the first week of June so she moved her follow up appt with him until the end of June. Please call pt to advise about getting MRI as well.

## 2021-05-10 NOTE — TELEPHONE ENCOUNTER
Called patient. She asked if she should complete PT prior to having her MRI. Per MALAIKA Tolbert's note she should do MRI if PT is not improving her symptoms. Informed patient. She will follow up with [facility] after PT and decide if MRI is needed.

## 2021-06-02 ENCOUNTER — OFFICE VISIT (OUTPATIENT)
Dept: PHYSICAL THERAPY | Age: 31
End: 2021-06-02
Attending: PHYSICIAN ASSISTANT
Payer: COMMERCIAL

## 2021-06-02 DIAGNOSIS — M47.816 LUMBAR SPONDYLOSIS: ICD-10-CM

## 2021-06-02 DIAGNOSIS — M54.50 ACUTE BILATERAL LOW BACK PAIN WITHOUT SCIATICA: ICD-10-CM

## 2021-06-02 PROCEDURE — 97110 THERAPEUTIC EXERCISES: CPT

## 2021-06-02 PROCEDURE — 97161 PT EVAL LOW COMPLEX 20 MIN: CPT

## 2021-06-02 NOTE — PROGRESS NOTES
SPINE EVALUATION:   Referring Physician: Dr. Krunal Kruse  Diagnosis: Low back pain     Date of Service: 6/2/2021     PATIENT SUMMARY   Kwasi Barrera is a 28year old female who presents to therapy today with complaints of low back pain since February 2021.  She goals.     Precautions:  Latex Allergy  OBJECTIVE:   Observation/Posture:  Overweight female, increased lumbar lordosis in standing  Neuro Screen: Intact    Lumbar spine AROM: (* denotes performed with pain)  Flexion: 35 deg*  Extension: 40 deg  Sidebending health  3. Normal hamstring flexibility without pain at 80 deg  4.  Instruct pt in healthy body mechanics for bending and lifting to prevent future episodes of back pain    Frequency / Duration: Patient will be seen for 2 x/week or a total of 8 visits over

## 2021-06-04 ENCOUNTER — OFFICE VISIT (OUTPATIENT)
Dept: PHYSICAL THERAPY | Age: 31
End: 2021-06-04
Attending: PHYSICIAN ASSISTANT
Payer: COMMERCIAL

## 2021-06-04 DIAGNOSIS — M47.816 LUMBAR SPONDYLOSIS: ICD-10-CM

## 2021-06-04 DIAGNOSIS — M54.50 ACUTE BILATERAL LOW BACK PAIN WITHOUT SCIATICA: ICD-10-CM

## 2021-06-04 PROCEDURE — 97140 MANUAL THERAPY 1/> REGIONS: CPT

## 2021-06-04 PROCEDURE — 97110 THERAPEUTIC EXERCISES: CPT

## 2021-06-04 NOTE — PROGRESS NOTES
Dx: Acute bilateral LBP         Insurance (Authorized # of Visits): Amelia Sarmiento PPO (8)           Authorizing Physician: Dr. Shruthi Newman  Next MD visit: none scheduled  Fall Risk: standard         Precautions: Latex allergy           Subjective: slight tightness p side lying clam, prone press up, LTR    Charges: EX 2, MT 1       Total Timed Treatment: 45 min  Total Treatment Time: 45 min

## 2021-06-07 ENCOUNTER — OFFICE VISIT (OUTPATIENT)
Dept: PHYSICAL THERAPY | Age: 31
End: 2021-06-07
Attending: PHYSICIAN ASSISTANT
Payer: COMMERCIAL

## 2021-06-07 DIAGNOSIS — M54.50 ACUTE BILATERAL LOW BACK PAIN WITHOUT SCIATICA: ICD-10-CM

## 2021-06-07 DIAGNOSIS — M47.816 LUMBAR SPONDYLOSIS: ICD-10-CM

## 2021-06-07 PROCEDURE — 97110 THERAPEUTIC EXERCISES: CPT

## 2021-06-07 PROCEDURE — 97112 NEUROMUSCULAR REEDUCATION: CPT

## 2021-06-07 NOTE — PROGRESS NOTES
Dx: Acute bilateral LBP         Insurance (Authorized # of Visits): Vince Hernandez PPO (8)           Authorizing Physician: Dr. Anton Muhammad  Next MD visit: none scheduled  Fall Risk: standard         Precautions: Latex allergy           Subjective:  Mild discomfort a hamstring stretch R/L 30 sec hold  Single leg long axis distraction R/L 2 mins      Side lying Clam R/L x 10  Lumbar rotation mobilizations grade 2 1 min each R/L Side lying clam R/L x 10  Lumbar spine rotation mobilizations grade 2-3 2 mins      Silvia romero

## 2021-06-09 ENCOUNTER — OFFICE VISIT (OUTPATIENT)
Dept: PHYSICAL THERAPY | Age: 31
End: 2021-06-09
Attending: PHYSICIAN ASSISTANT
Payer: COMMERCIAL

## 2021-06-09 DIAGNOSIS — M54.50 ACUTE BILATERAL LOW BACK PAIN WITHOUT SCIATICA: ICD-10-CM

## 2021-06-09 DIAGNOSIS — M47.816 LUMBAR SPONDYLOSIS: ICD-10-CM

## 2021-06-09 PROCEDURE — 97110 THERAPEUTIC EXERCISES: CPT

## 2021-06-09 PROCEDURE — 97112 NEUROMUSCULAR REEDUCATION: CPT

## 2021-06-09 NOTE — PROGRESS NOTES
Dx: Acute bilateral LBP         Insurance (Authorized # of Visits): Simran BURKS (8)           Authorizing Physician: Dr. Bety Salas MD visit: none scheduled  Fall Risk: standard         Precautions: Latex allergy           Subjective:  Mild discomfort a sec hold  Single leg long axis distraction R/L 2 mins Side lying hip abd R/L x 10  Side lying clam R/L x 10  Hook lying bridging x 10  Hook lying bent leg lift/lower x 10     Side lying Clam R/L x 10  Lumbar rotation mobilizations grade 2 1 min each R/L Si

## 2021-06-11 ENCOUNTER — OFFICE VISIT (OUTPATIENT)
Dept: INTERNAL MEDICINE CLINIC | Facility: CLINIC | Age: 31
End: 2021-06-11
Payer: COMMERCIAL

## 2021-06-11 VITALS
HEIGHT: 62 IN | WEIGHT: 230.81 LBS | BODY MASS INDEX: 42.47 KG/M2 | TEMPERATURE: 99 F | HEART RATE: 106 BPM | SYSTOLIC BLOOD PRESSURE: 134 MMHG | RESPIRATION RATE: 16 BRPM | OXYGEN SATURATION: 98 % | DIASTOLIC BLOOD PRESSURE: 82 MMHG

## 2021-06-11 DIAGNOSIS — E66.01 MORBID OBESITY WITH BMI OF 45.0-49.9, ADULT (HCC): Primary | ICD-10-CM

## 2021-06-11 DIAGNOSIS — J30.89 ALLERGIC RHINITIS DUE TO OTHER ALLERGIC TRIGGER, UNSPECIFIED SEASONALITY: ICD-10-CM

## 2021-06-11 PROCEDURE — 3008F BODY MASS INDEX DOCD: CPT | Performed by: INTERNAL MEDICINE

## 2021-06-11 PROCEDURE — 99214 OFFICE O/P EST MOD 30 MIN: CPT | Performed by: INTERNAL MEDICINE

## 2021-06-11 PROCEDURE — 3079F DIAST BP 80-89 MM HG: CPT | Performed by: INTERNAL MEDICINE

## 2021-06-11 PROCEDURE — 3075F SYST BP GE 130 - 139MM HG: CPT | Performed by: INTERNAL MEDICINE

## 2021-06-11 RX ORDER — PHENTERMINE HYDROCHLORIDE 37.5 MG/1
37.5 TABLET ORAL
Qty: 30 TABLET | Refills: 1 | Status: SHIPPED | OUTPATIENT
Start: 2021-06-11 | End: 2021-08-07

## 2021-06-11 NOTE — PATIENT INSTRUCTIONS
See you in 2 months  Think about saxenda and let me know if you are interested in trying  Keep an eye on your heart rate and blood pressure while on phentermine

## 2021-06-11 NOTE — PROGRESS NOTES
Patient Office Visit    ASSESSMENT AND PLAN:   (E66.01,  W46.09) Morbid obesity with BMI of 45.0-49.9, adult ([facility] [address]  (primary encounter diagnosis)  Plan: Phentermine HCl 37.5 MG Oral Tab  Note: will continue with phentermine for now, but will keep an eye on starting montelukast     Past Medical History:   Diagnosis Date   • Allergic rhinitis    • Gallbladder disorder    • Kidney stones    • Morbid obesity with BMI of 40.0-44.9, adult [facility])        Past Surgical History:   Procedure Laterality Date   • LAPAROSC Genitourinary:  normal urination no hematuria, no frequency   Musculoskeletal: no pains in arms/legs, normal range of motion   Skin: no rashes or skin lesions that are new   Neurological:  no weakness, no numbness, normal gait   Hematological:  no bruise

## 2021-06-14 ENCOUNTER — OFFICE VISIT (OUTPATIENT)
Dept: PHYSICAL THERAPY | Age: 31
End: 2021-06-14
Attending: PHYSICIAN ASSISTANT
Payer: COMMERCIAL

## 2021-06-14 DIAGNOSIS — M54.50 ACUTE BILATERAL LOW BACK PAIN WITHOUT SCIATICA: ICD-10-CM

## 2021-06-14 DIAGNOSIS — M47.816 LUMBAR SPONDYLOSIS: ICD-10-CM

## 2021-06-14 PROCEDURE — 97110 THERAPEUTIC EXERCISES: CPT

## 2021-06-14 PROCEDURE — 97112 NEUROMUSCULAR REEDUCATION: CPT

## 2021-06-14 NOTE — PROGRESS NOTES
Dx: Acute bilateral LBP         Insurance (Authorized # of Visits): Liz BURKS (8)           Authorizing Physician: Dr. Johnathan Salas MD visit: none scheduled  Fall Risk: standard         Precautions: Latex allergy           Subjective:  No longer feel di 10 x 3 sec hold  Hook lying LTR x 10  Hook lying bridging x 10  Hook lying bent leg lift R/L x 10 Supine SLR R/L x 10  Manual hamstring stretch R/L 30 sec hold  Single leg long axis distraction R/L 2 mins Side lying hip abd R/L x 10  Side lying clam R/L x prone press up, LTR    Charges: EX 2, NMRed 1       Total Timed Treatment: 45 min  Total Treatment Time: 45 min

## 2021-06-16 ENCOUNTER — OFFICE VISIT (OUTPATIENT)
Dept: PHYSICAL THERAPY | Age: 31
End: 2021-06-16
Attending: PHYSICIAN ASSISTANT
Payer: COMMERCIAL

## 2021-06-16 DIAGNOSIS — M47.816 LUMBAR SPONDYLOSIS: ICD-10-CM

## 2021-06-16 DIAGNOSIS — M54.50 ACUTE BILATERAL LOW BACK PAIN WITHOUT SCIATICA: ICD-10-CM

## 2021-06-16 PROCEDURE — 97112 NEUROMUSCULAR REEDUCATION: CPT

## 2021-06-16 PROCEDURE — 97110 THERAPEUTIC EXERCISES: CPT

## 2021-06-16 NOTE — PROGRESS NOTES
Dx: Acute bilateral LBP         Insurance (Authorized # of Visits): Trula Fleischer PPO (8)           Authorizing Physician: Dr. Martín Mcintosh  Next MD visit: none scheduled  Fall Risk: standard         Precautions: Latex allergy           Subjective:  No longer feel di hold  Hook lying LTR x 10  Hook lying bridging x 10  Hook lying bent leg lift R/L x 10 Supine SLR R/L x 10  Manual hamstring stretch R/L 30 sec hold  Single leg long axis distraction R/L 2 mins Side lying hip abd R/L x 10  Side lying clam R/L x 10  Hook ly 20  Standing AROM lumbar spine flex, side flex, ext x 3 each Cable pulley 15# chops and reverse chops x 10 ea R/L  Standing dead lift with bar x 15  Single leg dead lift reach to chair R/L x 10  SL press 6 bands R/L x 20  Cable pulley fwd punch with isidoro

## 2021-06-21 ENCOUNTER — OFFICE VISIT (OUTPATIENT)
Dept: PHYSICAL THERAPY | Age: 31
End: 2021-06-21
Attending: PHYSICIAN ASSISTANT
Payer: COMMERCIAL

## 2021-06-21 DIAGNOSIS — M47.816 LUMBAR SPONDYLOSIS: ICD-10-CM

## 2021-06-21 DIAGNOSIS — M54.50 ACUTE BILATERAL LOW BACK PAIN WITHOUT SCIATICA: ICD-10-CM

## 2021-06-21 PROCEDURE — 97110 THERAPEUTIC EXERCISES: CPT

## 2021-06-21 PROCEDURE — 97112 NEUROMUSCULAR REEDUCATION: CPT

## 2021-06-21 NOTE — PROGRESS NOTES
Dx: Acute bilateral LBP         Insurance (Authorized # of Visits): Bill BURKS (8)           Authorizing Physician: Dr. Blanche Lazaro  Next MD visit: none scheduled  Fall Risk: standard         Precautions: Latex allergy           Subjective:  I was a little so ext R/L x 10  x 10   x 10    R/L 30 sec hold   x 10 Prone press up x 10  Alternating knee flexion R/L x 10  Alternating hip ext R/L x 10  Manual quads stretch R/L 30 sec hold   Hook lying abd bracing 10 x 3 sec hold  Hook lying LTR x 10  Hook lying bridgin bracing x 20  Single leg dead lift with hip ext/ reach to chair R/L x 10  Step up fwd onto BOSU with opposite hip flexion R/L x 10  Cable pulleys 5# side stepping for multifidus 2 steps out/in R/L x 10  Trunk rotation 5# R/L x 10 Side stepping 5# 2 steps o

## 2021-06-23 ENCOUNTER — OFFICE VISIT (OUTPATIENT)
Dept: PHYSICAL THERAPY | Age: 31
End: 2021-06-23
Attending: PHYSICIAN ASSISTANT
Payer: COMMERCIAL

## 2021-06-23 DIAGNOSIS — M47.816 LUMBAR SPONDYLOSIS: ICD-10-CM

## 2021-06-23 DIAGNOSIS — M54.50 ACUTE BILATERAL LOW BACK PAIN WITHOUT SCIATICA: ICD-10-CM

## 2021-06-23 PROCEDURE — 97112 NEUROMUSCULAR REEDUCATION: CPT

## 2021-06-23 PROCEDURE — 97110 THERAPEUTIC EXERCISES: CPT

## 2021-06-24 NOTE — PROGRESS NOTES
Dx: Acute bilateral LBP         Insurance (Authorized # of Visits): Simran BURKS (8)           Authorizing Physician: Dr. Bety Salas MD visit: none scheduled  Fall Risk: standard         Precautions: Latex allergy         Discharge Note:    Subjective: 10  Alternating knee flexion R/L x 10  Alternating hip ext R/L x 10  Manual quads stretch R/L 30 sec hold  x 10   x 10     x 10    30 sec hold   Hook lying abd bracing 10 x 3 sec hold  Hook lying LTR x 10  Hook lying bridging x 10  Hook lying bent leg lift down R/L x 10  Step ups fwd onto BOSU R/L x 10  Fwd partial lunge to BOSU R/L x 10  Standing on BOSU mini squat x 15, rock A/P and lateral x 15 Sit to stand with arms outstretched x 10  Green cord pull down with abd bracing x 20  Single leg dead lift with

## 2021-06-25 ENCOUNTER — OFFICE VISIT (OUTPATIENT)
Dept: SURGERY | Facility: CLINIC | Age: 31
End: 2021-06-25
Payer: COMMERCIAL

## 2021-06-25 DIAGNOSIS — M47.816 LUMBAR SPONDYLOSIS: Primary | ICD-10-CM

## 2021-06-25 DIAGNOSIS — G89.29 CHRONIC MIDLINE LOW BACK PAIN WITHOUT SCIATICA: ICD-10-CM

## 2021-06-25 DIAGNOSIS — M54.50 CHRONIC MIDLINE LOW BACK PAIN WITHOUT SCIATICA: ICD-10-CM

## 2021-06-25 PROCEDURE — 99213 OFFICE O/P EST LOW 20 MIN: CPT | Performed by: PHYSICIAN ASSISTANT

## 2021-06-25 RX ORDER — METHYLPREDNISOLONE 4 MG/1
TABLET ORAL
Qty: 1 EACH | Refills: 0 | Status: SHIPPED | OUTPATIENT
Start: 2021-06-25 | End: 2021-09-15

## 2021-06-25 NOTE — PROGRESS NOTES
[facility]   Follow-up      HISTORY OF PRESENT Filomena Waite is a 28year old RH female returns in follow-up.   Since her last visit she did take the Medrol Dosepak she was on NSAIDs for couple of weeks she has been in physical therapy current extension of her low back with no pain.     Upper extremity strength: Last exam      Deltoid    Triceps     Biceps        Wrist Extension  Finger extension Thumb Abduction  Thumb adduction Intrinsics   Right         5        5         5          5 5 5 5

## 2021-06-25 NOTE — PATIENT INSTRUCTIONS
Refill policies:    • Allow 2-3 business days for refills; controlled substances may take longer.   • Contact your pharmacy at least 5 days prior to running out of medication and have them send an electronic request or submit request through the “request re Depending on your insurance carrier, approval may take 3-10 days. It is highly recommended patients contact their insurance carrier directly to determine coverage.   If test is done without insurance authorization, patient may be responsible for the entire over-the-counter NSAIDs before any new activities to offset any potential flareups  -When she feels flareup coming on I recommend she take anti-inflammatories immediately the morning of  -She was given a Medrol Dosepak for any future flareups that are refr

## 2021-08-07 ENCOUNTER — OFFICE VISIT (OUTPATIENT)
Dept: INTERNAL MEDICINE CLINIC | Facility: CLINIC | Age: 31
End: 2021-08-07
Payer: COMMERCIAL

## 2021-08-07 VITALS
WEIGHT: 226 LBS | HEIGHT: 62 IN | RESPIRATION RATE: 16 BRPM | DIASTOLIC BLOOD PRESSURE: 68 MMHG | BODY MASS INDEX: 41.59 KG/M2 | TEMPERATURE: 98 F | OXYGEN SATURATION: 98 % | HEART RATE: 113 BPM | SYSTOLIC BLOOD PRESSURE: 114 MMHG

## 2021-08-07 DIAGNOSIS — G89.29 CHRONIC BILATERAL LOW BACK PAIN WITHOUT SCIATICA: ICD-10-CM

## 2021-08-07 DIAGNOSIS — E66.01 MORBID OBESITY WITH BMI OF 45.0-49.9, ADULT (HCC): Primary | ICD-10-CM

## 2021-08-07 DIAGNOSIS — J30.89 ALLERGIC RHINITIS DUE TO OTHER ALLERGIC TRIGGER, UNSPECIFIED SEASONALITY: ICD-10-CM

## 2021-08-07 DIAGNOSIS — M54.50 CHRONIC BILATERAL LOW BACK PAIN WITHOUT SCIATICA: ICD-10-CM

## 2021-08-07 PROCEDURE — 3008F BODY MASS INDEX DOCD: CPT | Performed by: INTERNAL MEDICINE

## 2021-08-07 PROCEDURE — 99214 OFFICE O/P EST MOD 30 MIN: CPT | Performed by: INTERNAL MEDICINE

## 2021-08-07 PROCEDURE — 3074F SYST BP LT 130 MM HG: CPT | Performed by: INTERNAL MEDICINE

## 2021-08-07 PROCEDURE — 3078F DIAST BP <80 MM HG: CPT | Performed by: INTERNAL MEDICINE

## 2021-08-07 RX ORDER — SEMAGLUTIDE 0.25 MG/.5ML
0.25 INJECTION, SOLUTION SUBCUTANEOUS WEEKLY
Qty: 2 ML | Refills: 0 | Status: SHIPPED | OUTPATIENT
Start: 2021-08-07 | End: 2021-08-18

## 2021-08-07 RX ORDER — TOPIRAMATE 50 MG/1
TABLET, FILM COATED ORAL
Qty: 30 TABLET | Refills: 0 | Status: SHIPPED | OUTPATIENT
Start: 2021-08-07 | End: 2021-08-07

## 2021-08-07 NOTE — PATIENT INSTRUCTIONS
Complete the phentermine and lets start wegovy after that. Its once a week dose. Please use the coupon code. Let me know if it is too expensive.

## 2021-08-07 NOTE — PROGRESS NOTES
Patient Office Visit    ASSESSMENT AND PLAN:   (E66.01,  Q70.70) Morbid obesity with BMI of 45.0-49.9, adult ([facility] [address]  (primary encounter diagnosis)  Plan: semaglutide-weight management (WEGOVY) 0.25         MG/0.5ML Subcutaneous Solution Auto-injector,   Not injections  2. Allergies: stable  3.  Chronic low back pain: overall stable     Past Medical History:   Diagnosis Date   • Allergic rhinitis    • Gallbladder disorder    • Kidney stones    • Morbid obesity with BMI of 40.0-44.9, adult [facility])        Past Surg weight changes   HENT: normal sinuses and no mouth issues   Eyes: . normal vision no eye pain   Respiratory: normal respirations no cough   Cardiovascular: no CP, or palpitations   Gastrointestinal: normal bowels and no abd pains   Genitourinary:  normal u

## 2021-08-17 ENCOUNTER — TELEPHONE (OUTPATIENT)
Dept: INTERNAL MEDICINE CLINIC | Facility: CLINIC | Age: 31
End: 2021-08-17

## 2021-08-17 DIAGNOSIS — E66.01 MORBID OBESITY WITH BMI OF 45.0-49.9, ADULT (HCC): ICD-10-CM

## 2021-08-17 NOTE — TELEPHONE ENCOUNTER
Incoming fax from [place] for problem with [facility] MAXIMILIANO HOYOS. Contacted pharmacy - they state at the moment they are not getting this medication from the . MD can change to a diff medication or we can try other pharmacy. This is only issue with all [address] [place] [address]. Attempted to contact pt to notify no answer. Sent pt [employer].

## 2021-08-18 ENCOUNTER — TELEPHONE (OUTPATIENT)
Dept: INTERNAL MEDICINE CLINIC | Facility: CLINIC | Age: 31
End: 2021-08-18

## 2021-08-18 DIAGNOSIS — E66.01 MORBID OBESITY WITH BMI OF 45.0-49.9, ADULT (HCC): ICD-10-CM

## 2021-08-18 RX ORDER — SEMAGLUTIDE 0.25 MG/.5ML
0.25 INJECTION, SOLUTION SUBCUTANEOUS WEEKLY
Qty: 2 ML | Refills: 0 | Status: SHIPPED | OUTPATIENT
Start: 2021-08-18 | End: 2021-09-09

## 2021-08-18 RX ORDER — SEMAGLUTIDE 0.25 MG/.5ML
0.25 INJECTION, SOLUTION SUBCUTANEOUS WEEKLY
Qty: 2 ML | Refills: 0 | Status: CANCELLED | OUTPATIENT
Start: 2021-08-18 | End: 2021-09-09

## 2021-08-18 NOTE — TELEPHONE ENCOUNTER
Spoke to [employer] and they do not have [place] available. Also not sure when they will have it and they state it is across all [employer] stores. [address] - see pt [facility]. Pending response.

## 2021-09-09 ENCOUNTER — APPOINTMENT (OUTPATIENT)
Dept: GENERAL RADIOLOGY | Facility: HOSPITAL | Age: 31
End: 2021-09-09
Payer: COMMERCIAL

## 2021-09-09 ENCOUNTER — HOSPITAL ENCOUNTER (EMERGENCY)
Facility: HOSPITAL | Age: 31
Discharge: HOME | End: 2021-09-09
Attending: EMERGENCY MEDICINE
Payer: COMMERCIAL

## 2021-09-09 VITALS
RESPIRATION RATE: 19 BRPM | WEIGHT: 230 LBS | HEIGHT: 62 IN | HEART RATE: 86 BPM | TEMPERATURE: 99 F | SYSTOLIC BLOOD PRESSURE: 148 MMHG | DIASTOLIC BLOOD PRESSURE: 92 MMHG | BODY MASS INDEX: 42.33 KG/M2 | OXYGEN SATURATION: 100 %

## 2021-09-09 DIAGNOSIS — R07.9 CHEST PAIN OF UNCERTAIN ETIOLOGY: Primary | ICD-10-CM

## 2021-09-09 LAB
ALBUMIN SERPL-MCNC: 3.3 G/DL (ref 3.4–5)
ALBUMIN/GLOB SERPL: 0.7 {RATIO} (ref 1–2)
ALP LIVER SERPL-CCNC: 59 U/L
ALT SERPL-CCNC: 17 U/L
ANION GAP SERPL CALC-SCNC: 6 MMOL/L (ref 0–18)
AST SERPL-CCNC: 14 U/L (ref 15–37)
ATRIAL RATE: 104 BPM
BASOPHILS # BLD AUTO: 0.03 X10(3) UL (ref 0–0.2)
BASOPHILS NFR BLD AUTO: 0.4 %
BILIRUB SERPL-MCNC: 0.2 MG/DL (ref 0.1–2)
BUN BLD-MCNC: 12 MG/DL (ref 7–18)
CALCIUM BLD-MCNC: 8.9 MG/DL (ref 8.5–10.1)
CHLORIDE SERPL-SCNC: 105 MMOL/L (ref 98–112)
CO2 SERPL-SCNC: 25 MMOL/L (ref 21–32)
CREAT BLD-MCNC: 0.61 MG/DL
D-DIMER: <0.27 UG/ML FEU (ref ?–0.5)
EOSINOPHIL # BLD AUTO: 0.03 X10(3) UL (ref 0–0.7)
EOSINOPHIL NFR BLD AUTO: 0.4 %
ERYTHROCYTE [DISTWIDTH] IN BLOOD BY AUTOMATED COUNT: 12.8 %
GLOBULIN PLAS-MCNC: 4.6 G/DL (ref 2.8–4.4)
GLUCOSE BLD-MCNC: 88 MG/DL (ref 70–99)
HCT VFR BLD AUTO: 39.3 %
HGB BLD-MCNC: 12.6 G/DL
IMM GRANULOCYTES # BLD AUTO: 0.03 X10(3) UL (ref 0–1)
IMM GRANULOCYTES NFR BLD: 0.4 %
LIPASE SERPL-CCNC: 71 U/L (ref 73–393)
LYMPHOCYTES # BLD AUTO: 2.61 X10(3) UL (ref 1–4)
LYMPHOCYTES NFR BLD AUTO: 32.1 %
M PROTEIN MFR SERPL ELPH: 7.9 G/DL (ref 6.4–8.2)
MCH RBC QN AUTO: 29.4 PG (ref 26–34)
MCHC RBC AUTO-ENTMCNC: 32.1 G/DL (ref 31–37)
MCV RBC AUTO: 91.8 FL
MONOCYTES # BLD AUTO: 0.74 X10(3) UL (ref 0.1–1)
MONOCYTES NFR BLD AUTO: 9.1 %
NEUTROPHILS # BLD AUTO: 4.69 X10 (3) UL (ref 1.5–7.7)
NEUTROPHILS # BLD AUTO: 4.69 X10(3) UL (ref 1.5–7.7)
NEUTROPHILS NFR BLD AUTO: 57.6 %
OSMOLALITY SERPL CALC.SUM OF ELEC: 281 MOSM/KG (ref 275–295)
P AXIS: 52 DEGREES
P-R INTERVAL: 154 MS
PLATELET # BLD AUTO: 289 10(3)UL (ref 150–450)
POTASSIUM SERPL-SCNC: 3.5 MMOL/L (ref 3.5–5.1)
Q-T INTERVAL: 346 MS
QRS DURATION: 74 MS
QTC CALCULATION (BEZET): 454 MS
R AXIS: 49 DEGREES
RBC # BLD AUTO: 4.28 X10(6)UL
SARS-COV-2 RNA RESP QL NAA+PROBE: NOT DETECTED
SODIUM SERPL-SCNC: 136 MMOL/L (ref 136–145)
T AXIS: 49 DEGREES
TROPONIN I SERPL-MCNC: <0.045 NG/ML (ref ?–0.04)
VENTRICULAR RATE: 104 BPM
WBC # BLD AUTO: 8.1 X10(3) UL (ref 4–11)

## 2021-09-09 PROCEDURE — 36415 COLL VENOUS BLD VENIPUNCTURE: CPT

## 2021-09-09 PROCEDURE — 85025 COMPLETE CBC W/AUTO DIFF WBC: CPT

## 2021-09-09 PROCEDURE — 83690 ASSAY OF LIPASE: CPT | Performed by: EMERGENCY MEDICINE

## 2021-09-09 PROCEDURE — 71045 X-RAY EXAM CHEST 1 VIEW: CPT | Performed by: EMERGENCY MEDICINE

## 2021-09-09 PROCEDURE — 84484 ASSAY OF TROPONIN QUANT: CPT | Performed by: EMERGENCY MEDICINE

## 2021-09-09 PROCEDURE — 85025 COMPLETE CBC W/AUTO DIFF WBC: CPT | Performed by: EMERGENCY MEDICINE

## 2021-09-09 PROCEDURE — 93005 ELECTROCARDIOGRAM TRACING: CPT

## 2021-09-09 PROCEDURE — 99284 EMERGENCY DEPT VISIT MOD MDM: CPT

## 2021-09-09 PROCEDURE — 80053 COMPREHEN METABOLIC PANEL: CPT

## 2021-09-09 PROCEDURE — 80053 COMPREHEN METABOLIC PANEL: CPT | Performed by: EMERGENCY MEDICINE

## 2021-09-09 PROCEDURE — 93010 ELECTROCARDIOGRAM REPORT: CPT

## 2021-09-09 PROCEDURE — 84484 ASSAY OF TROPONIN QUANT: CPT

## 2021-09-09 PROCEDURE — 85379 FIBRIN DEGRADATION QUANT: CPT | Performed by: EMERGENCY MEDICINE

## 2021-09-09 NOTE — ED PROVIDER NOTES
Patient Seen in: [facility] Emergency Department      History   Patient presents with:  Chest Pain Angina    Stated Complaint: cp    HPI/Subjective:   HPI    36-year-old female presents to the emergency department with complaints of intermittent epi reviewed and are negative. Positive for stated complaint: cp  Other systems are as noted in HPI. Constitutional and vital signs reviewed. All other systems reviewed and negative except as noted above.     Physical Exam     ED Triage Vitals [09/09 cranial nerve deficit.       Coordination: Coordination normal.   Psychiatric:         Mood and Affect: Mood normal.              ED Course     Labs Reviewed   COMP METABOLIC PANEL (14) - Abnormal; Notable for the following components:       Result Value obtained. Currently she has no significant discomfort. She was placed on a monitor and did have heart rates that were in the upper 90s to low 100s. Patient's EKG was essentially normal.  She had negative troponin.   Her chest pain is atypical for a c

## 2021-09-15 ENCOUNTER — OFFICE VISIT (OUTPATIENT)
Dept: INTERNAL MEDICINE CLINIC | Facility: CLINIC | Age: 31
End: 2021-09-15
Payer: COMMERCIAL

## 2021-09-15 ENCOUNTER — TELEPHONE (OUTPATIENT)
Dept: INTERNAL MEDICINE CLINIC | Facility: CLINIC | Age: 31
End: 2021-09-15

## 2021-09-15 VITALS
RESPIRATION RATE: 16 BRPM | WEIGHT: 226.81 LBS | HEART RATE: 80 BPM | BODY MASS INDEX: 41.74 KG/M2 | DIASTOLIC BLOOD PRESSURE: 70 MMHG | OXYGEN SATURATION: 99 % | SYSTOLIC BLOOD PRESSURE: 102 MMHG | HEIGHT: 62 IN | TEMPERATURE: 98 F

## 2021-09-15 DIAGNOSIS — Z00.00 ROUTINE PHYSICAL EXAMINATION: Primary | ICD-10-CM

## 2021-09-15 DIAGNOSIS — E66.01 MORBID OBESITY WITH BMI OF 45.0-49.9, ADULT (HCC): ICD-10-CM

## 2021-09-15 DIAGNOSIS — K21.9 GASTROESOPHAGEAL REFLUX DISEASE WITHOUT ESOPHAGITIS: ICD-10-CM

## 2021-09-15 PROCEDURE — 3078F DIAST BP <80 MM HG: CPT | Performed by: INTERNAL MEDICINE

## 2021-09-15 PROCEDURE — 3008F BODY MASS INDEX DOCD: CPT | Performed by: INTERNAL MEDICINE

## 2021-09-15 PROCEDURE — 3074F SYST BP LT 130 MM HG: CPT | Performed by: INTERNAL MEDICINE

## 2021-09-15 PROCEDURE — 99395 PREV VISIT EST AGE 18-39: CPT | Performed by: INTERNAL MEDICINE

## 2021-09-15 RX ORDER — SEMAGLUTIDE 0.5 MG/.5ML
0.5 INJECTION, SOLUTION SUBCUTANEOUS WEEKLY
Qty: 2 ML | Refills: 0 | Status: SHIPPED | OUTPATIENT
Start: 2021-09-15 | End: 2021-10-04

## 2021-09-15 RX ORDER — SEMAGLUTIDE 0.25 MG/.5ML
0.25 INJECTION, SOLUTION SUBCUTANEOUS WEEKLY
Qty: 1 EACH | Refills: 0 | Status: SHIPPED | OUTPATIENT
Start: 2021-09-15 | End: 2021-10-04

## 2021-09-15 RX ORDER — SEMAGLUTIDE 0.25 MG/.5ML
0.25 INJECTION, SOLUTION SUBCUTANEOUS WEEKLY
COMMUNITY
End: 2021-09-15

## 2021-09-15 NOTE — PATIENT INSTRUCTIONS
Continue to exercise at least 150 minutes a week and Eat a plant based diet    Please take 2000 IU of vitamin D daily and Please take 1200 mg of calcium daily (through diet or supplements)  Please send pap test results from your gynecologist  Let me know i

## 2021-09-15 NOTE — PROGRESS NOTES
Patient Office Visit    ASSESSMENT AND PLAN:   (Z00.00) Routine physical examination  (primary encounter diagnosis)  Plan: reviewed previous test results. Patient has a gynecologist and will follow up with a pap with him.  Continue to exercise at least 150 semaglutide-weight management (WEGOVY) 0.5 MG/0.5ML Subcutaneous Solution Auto-injector 2 mL 0     Sig: Inject 0.5 mL (0.5 mg total) into the skin once a week for 4 doses.          Miriam Carmen DO  CC:  Patient presents with:  Physical: Gyne: Dr Payton Melton Paternal Grandmother    • Heart Disease Paternal Grandmother    • Other (Other) Paternal Grandmother    • Diabetes Paternal Grandfather    • Heart Disease Paternal Grandfather      Allergies:    Latex                   RASH    Comment:RASH FROM LATEX GLOVE thyroidmegaly  Cardiovascular: nl s1 s2 no m/r/g  Pulmonary/Chest: CTA bilaterally with no wheezes  Abdominal: Soft NT normal Bowel sounds  Musculoskeletal:  normal ROM in UE and LE  Extremities: no pedal edema   Neurological:  no weakness in UE and LE, re

## 2021-09-23 ENCOUNTER — TELEPHONE (OUTPATIENT)
Dept: INTERNAL MEDICINE CLINIC | Facility: CLINIC | Age: 31
End: 2021-09-23

## 2021-09-23 NOTE — TELEPHONE ENCOUNTER
[facility] pharmacy called stating semaglutide-weight management (WEGOVY) 0.25 MG/0.5ML Subcutaneous Solution Auto-injector  Is on national back order.  They would like a call back with alternatives

## 2021-09-27 NOTE — TELEPHONE ENCOUNTER
Called pharmacy and asked if they carried Saxenda     They stated that we can send an Rx to see if a prior authorization would be needed     Pharmacy stated that they only have 1 box left     Would you like to send Rx?

## 2021-10-04 ENCOUNTER — TELEPHONE (OUTPATIENT)
Dept: INTERNAL MEDICINE CLINIC | Facility: CLINIC | Age: 31
End: 2021-10-04

## 2021-10-04 DIAGNOSIS — E66.01 CLASS 3 SEVERE OBESITY DUE TO EXCESS CALORIES WITHOUT SERIOUS COMORBIDITY WITH BODY MASS INDEX (BMI) OF 45.0 TO 49.9 IN ADULT (HCC): Primary | ICD-10-CM

## 2021-10-04 RX ORDER — LIRAGLUTIDE 6 MG/ML
INJECTION, SOLUTION SUBCUTANEOUS
Qty: 3 ML | Refills: 0 | Status: SHIPPED | OUTPATIENT
Start: 2021-10-04 | End: 2021-11-22

## 2021-10-04 RX ORDER — PEN NEEDLE, DIABETIC 30 GX3/16"
1 NEEDLE, DISPOSABLE MISCELLANEOUS DAILY
Qty: 90 EACH | Refills: 0 | Status: SHIPPED | OUTPATIENT
Start: 2021-10-04 | End: 2022-01-02

## 2021-10-04 NOTE — TELEPHONE ENCOUNTER
Regarding: message  ----- Message from Marisel Hernandez LPN sent at 75/7/0616 10:56 AM CDT -----       ----- Message from Kailee Clayton to Twila Roblero DO sent at 10/4/2021 10:42 AM -----   That is fine with me.       ----- Message -----       From:

## 2021-11-22 DIAGNOSIS — E66.01 MORBID OBESITY WITH BMI OF 45.0-49.9, ADULT (HCC): Primary | ICD-10-CM

## 2021-11-22 RX ORDER — PEN NEEDLE, DIABETIC 30 GX3/16"
1 NEEDLE, DISPOSABLE MISCELLANEOUS DAILY
Qty: 90 EACH | Refills: 0 | Status: SHIPPED | OUTPATIENT
Start: 2021-11-22 | End: 2022-02-20

## 2021-11-22 RX ORDER — LIRAGLUTIDE 6 MG/ML
INJECTION, SOLUTION SUBCUTANEOUS
Qty: 12 ML | Refills: 0 | Status: SHIPPED | OUTPATIENT
Start: 2021-11-22 | End: 2022-01-19

## 2021-11-22 RX ORDER — SEMAGLUTIDE 0.25 MG/.5ML
0.25 INJECTION, SOLUTION SUBCUTANEOUS WEEKLY
Qty: 2 ML | Refills: 0 | Status: SHIPPED | OUTPATIENT
Start: 2021-11-22 | End: 2021-11-22

## 2022-03-05 ENCOUNTER — TELEPHONE (OUTPATIENT)
Dept: INTERNAL MEDICINE CLINIC | Facility: CLINIC | Age: 32
End: 2022-03-05

## 2022-03-05 ENCOUNTER — LAB ENCOUNTER (OUTPATIENT)
Dept: LAB | Age: 32
End: 2022-03-05
Attending: INTERNAL MEDICINE
Payer: COMMERCIAL

## 2022-03-05 ENCOUNTER — OFFICE VISIT (OUTPATIENT)
Dept: INTERNAL MEDICINE CLINIC | Facility: CLINIC | Age: 32
End: 2022-03-05
Payer: COMMERCIAL

## 2022-03-05 VITALS
BODY MASS INDEX: 43.1 KG/M2 | RESPIRATION RATE: 16 BRPM | HEIGHT: 62 IN | DIASTOLIC BLOOD PRESSURE: 80 MMHG | SYSTOLIC BLOOD PRESSURE: 115 MMHG | OXYGEN SATURATION: 98 % | TEMPERATURE: 99 F | HEART RATE: 86 BPM | WEIGHT: 234.19 LBS

## 2022-03-05 DIAGNOSIS — K92.89 GAS BLOAT SYNDROME: ICD-10-CM

## 2022-03-05 DIAGNOSIS — Z00.00 BLOOD TESTS FOR ROUTINE GENERAL PHYSICAL EXAMINATION: ICD-10-CM

## 2022-03-05 DIAGNOSIS — E66.01 MORBID OBESITY WITH BMI OF 45.0-49.9, ADULT (HCC): Primary | ICD-10-CM

## 2022-03-05 DIAGNOSIS — K21.9 GASTROESOPHAGEAL REFLUX DISEASE, UNSPECIFIED WHETHER ESOPHAGITIS PRESENT: ICD-10-CM

## 2022-03-05 LAB
CHOLEST SERPL-MCNC: 199 MG/DL (ref ?–200)
EST. AVERAGE GLUCOSE BLD GHB EST-MCNC: 100 MG/DL (ref 68–126)
FASTING PATIENT LIPID ANSWER: YES
HBA1C MFR BLD: 5.1 % (ref ?–5.7)
HDLC SERPL-MCNC: 63 MG/DL (ref 40–59)
LDLC SERPL CALC-MCNC: 112 MG/DL (ref ?–100)
NONHDLC SERPL-MCNC: 136 MG/DL (ref ?–130)
TRIGL SERPL-MCNC: 137 MG/DL (ref 30–149)
VLDLC SERPL CALC-MCNC: 24 MG/DL (ref 0–30)

## 2022-03-05 PROCEDURE — 3074F SYST BP LT 130 MM HG: CPT | Performed by: INTERNAL MEDICINE

## 2022-03-05 PROCEDURE — 3008F BODY MASS INDEX DOCD: CPT | Performed by: INTERNAL MEDICINE

## 2022-03-05 PROCEDURE — 83036 HEMOGLOBIN GLYCOSYLATED A1C: CPT | Performed by: INTERNAL MEDICINE

## 2022-03-05 PROCEDURE — 3079F DIAST BP 80-89 MM HG: CPT | Performed by: INTERNAL MEDICINE

## 2022-03-05 PROCEDURE — 99214 OFFICE O/P EST MOD 30 MIN: CPT | Performed by: INTERNAL MEDICINE

## 2022-03-05 PROCEDURE — 80061 LIPID PANEL: CPT | Performed by: INTERNAL MEDICINE

## 2022-03-05 RX ORDER — PEN NEEDLE, DIABETIC 30 GX3/16"
1 NEEDLE, DISPOSABLE MISCELLANEOUS
Qty: 30 EACH | Refills: 0 | Status: SHIPPED | OUTPATIENT
Start: 2022-03-05

## 2022-03-05 RX ORDER — SEMAGLUTIDE 0.5 MG/.5ML
0.5 INJECTION, SOLUTION SUBCUTANEOUS WEEKLY
Qty: 2 ML | Refills: 0 | Status: SHIPPED | OUTPATIENT
Start: 2022-03-05 | End: 2022-03-27

## 2022-03-05 NOTE — PATIENT INSTRUCTIONS
Please start the wegovy and let me know how you are doing    Also try to switch the omeprazole/lansoprazole to pepcid in the next 2 weeks

## 2022-03-05 NOTE — TELEPHONE ENCOUNTER
Faxed completed medical records form to [facility] office. Received confirmation. Form placed in accordion folder in POD#2 TO HOLD ON.

## 2022-04-09 ENCOUNTER — PATIENT MESSAGE (OUTPATIENT)
Dept: INTERNAL MEDICINE CLINIC | Facility: CLINIC | Age: 32
End: 2022-04-09

## 2022-04-09 ENCOUNTER — HOSPITAL ENCOUNTER (OUTPATIENT)
Age: 32
Discharge: HOME | End: 2022-04-09
Payer: COMMERCIAL

## 2022-04-09 VITALS
OXYGEN SATURATION: 100 % | SYSTOLIC BLOOD PRESSURE: 119 MMHG | TEMPERATURE: 98 F | DIASTOLIC BLOOD PRESSURE: 75 MMHG | HEART RATE: 74 BPM | BODY MASS INDEX: 42.69 KG/M2 | RESPIRATION RATE: 16 BRPM | WEIGHT: 232 LBS | HEIGHT: 62 IN

## 2022-04-09 DIAGNOSIS — J01.10 ACUTE NON-RECURRENT FRONTAL SINUSITIS: Primary | ICD-10-CM

## 2022-04-09 LAB — SARS-COV-2 RNA RESP QL NAA+PROBE: NOT DETECTED

## 2022-04-09 PROCEDURE — 99213 OFFICE O/P EST LOW 20 MIN: CPT

## 2022-04-09 RX ORDER — AMOXICILLIN AND CLAVULANATE POTASSIUM 875; 125 MG/1; MG/1
1 TABLET, FILM COATED ORAL 2 TIMES DAILY
Qty: 20 TABLET | Refills: 0 | Status: SHIPPED | OUTPATIENT
Start: 2022-04-09 | End: 2022-04-19

## 2022-04-09 RX ORDER — PREDNISONE 20 MG/1
40 TABLET ORAL DAILY
Qty: 10 TABLET | Refills: 0 | Status: SHIPPED | OUTPATIENT
Start: 2022-04-09 | End: 2022-04-14

## 2022-04-09 RX ORDER — AZELASTINE 1 MG/ML
1 SPRAY, METERED NASAL 2 TIMES DAILY
Qty: 30 ML | Refills: 0 | Status: SHIPPED | OUTPATIENT
Start: 2022-04-09

## 2022-04-09 NOTE — ED INITIAL ASSESSMENT (HPI)
Pt presents today with c/o sinus pressure/congestion- worse on the left side of her face since yesterday. Pt denies any cough or fevers.

## 2022-04-11 RX ORDER — SEMAGLUTIDE 0.5 MG/.5ML
0.5 INJECTION, SOLUTION SUBCUTANEOUS WEEKLY
Qty: 4 EACH | Refills: 0 | Status: SHIPPED | OUTPATIENT
Start: 2022-04-11

## 2022-04-11 NOTE — TELEPHONE ENCOUNTER
Ross Roberson, RN 4/10/2022 9:34 AM CDT      ----- Message -----  From: Alma Aguilar  Sent: 4/9/2022 8:54 AM CDT  To: [facility] Clinical Staff  Subject: Soha Wright Update     Just an update the Wegovy, for this month only lost 2lbs. The beginning of this round it seemed I was more hungry and not sure if it was lower dose then the saxsenda. Could I get the next round of Wegovy to continue?      Thank you,  Wen Gomes

## 2022-04-21 ENCOUNTER — PATIENT MESSAGE (OUTPATIENT)
Dept: SURGERY | Facility: CLINIC | Age: 32
End: 2022-04-21

## 2022-04-21 NOTE — TELEPHONE ENCOUNTER
Message below noted. Pt has not been seen in our office since 6/25/21-  She had new onset of symptoms due to a neck kink on Sunday which she stats agrivagted her lumbar issues. Pt will need to come in to the office for assessment to discuss need for new imaging and medication. LUMI Maskt message sent to pt informing her to call our office to schedule.

## 2022-04-21 NOTE — TELEPHONE ENCOUNTER
From: Todd Shay  To: MALAIKA Jean  Sent: 4/21/2022 6:07 AM CDT  Subject: Neck/shoulder/lower back issue    Hello,    Emir morning I did something to get a kink in my neck and shoulder area. If I turn my neck there is a quick zing down to the shoulder area. It has gotten a bit better after using a heating pad, letting hot water run on it, and taking advil. This seems to have triggered the lower back issue that you last saw me for. I am trying my stretches that PT taught me, but was wondering if you think it would be best to get the MRI done or just start another prednisone pack and see how that goes?

## 2022-04-21 NOTE — TELEPHONE ENCOUNTER
Noted that patient is requesting feedback via [employer] message. In patient's inquiry she is asking:    -if she may go ahead with the MRI lumbar spine that was ordered on 5/4/21.    -if she should use a medrol dosepak to treat reported pain. Message routed to Prior Auth team as inquiry of the status of the ordered MRI, as referral reads \"closed\". Routed to MALAIKA Alexander to address medication issue. Last medrol dosepak completed on 9/15/21.

## 2022-04-21 NOTE — TELEPHONE ENCOUNTER
Per Lior Castro regarding previous imaging,     \"Regarding MRI from 5/4/21 [facility] the order and worked on the [place]. Patient would need to call [facility]. [phone] if still going to [facility] for her MRI\"     Pt can call [facility] to see if they can attempt to get authorization for previous imaging but we do not have any recent office notes to provide for authorization purposes. Imaging is only valid for 1 yr so if they are able to get this authorized it will not be valid after 5/4/22. Pt already informed via [facility] that she should come in to be seen to discuss as per LOV with MALAIKA Quinones   \"follow up is needed\"    This was forwarded to provider by Tanisha Marroquin for input.

## 2022-04-25 ENCOUNTER — PATIENT MESSAGE (OUTPATIENT)
Dept: INTERNAL MEDICINE CLINIC | Facility: CLINIC | Age: 32
End: 2022-04-25

## 2022-04-25 RX ORDER — SEMAGLUTIDE 1 MG/.5ML
1 INJECTION, SOLUTION SUBCUTANEOUS WEEKLY
Qty: 4 ML | Refills: 0 | Status: SHIPPED | OUTPATIENT
Start: 2022-04-25 | End: 2022-05-17

## 2022-04-25 NOTE — TELEPHONE ENCOUNTER
From: Norma Blood  To: 32 \[facility]\"",   Sent: 4/9/2022 8:54 AM CDT  Subject: STWMRJ Update    Just an update the Wegovy, for this month only lost 2lbs. The beginning of this round it seemed I was more hungry and not sure if it was lower dose then the saxsenda. Could I get the next round of Wegovy to continue?      Thank you,  Real Rather

## 2022-05-03 ENCOUNTER — OFFICE VISIT (OUTPATIENT)
Dept: SURGERY | Facility: CLINIC | Age: 32
End: 2022-05-03
Payer: COMMERCIAL

## 2022-05-03 VITALS
BODY MASS INDEX: 42.69 KG/M2 | HEART RATE: 80 BPM | WEIGHT: 232 LBS | HEIGHT: 62 IN | SYSTOLIC BLOOD PRESSURE: 118 MMHG | DIASTOLIC BLOOD PRESSURE: 80 MMHG

## 2022-05-03 DIAGNOSIS — G95.9 CERVICAL MYELOPATHY WITH CERVICAL RADICULOPATHY (HCC): Primary | ICD-10-CM

## 2022-05-03 DIAGNOSIS — M54.12 CERVICAL MYELOPATHY WITH CERVICAL RADICULOPATHY (HCC): Primary | ICD-10-CM

## 2022-05-03 DIAGNOSIS — R29.898 WEAKNESS OF BOTH UPPER EXTREMITIES: ICD-10-CM

## 2022-05-03 DIAGNOSIS — M47.816 LUMBAR SPONDYLOSIS: ICD-10-CM

## 2022-05-03 PROCEDURE — 3008F BODY MASS INDEX DOCD: CPT | Performed by: PHYSICIAN ASSISTANT

## 2022-05-03 PROCEDURE — 99213 OFFICE O/P EST LOW 20 MIN: CPT | Performed by: PHYSICIAN ASSISTANT

## 2022-05-03 PROCEDURE — 3079F DIAST BP 80-89 MM HG: CPT | Performed by: PHYSICIAN ASSISTANT

## 2022-05-03 PROCEDURE — 3074F SYST BP LT 130 MM HG: CPT | Performed by: PHYSICIAN ASSISTANT

## 2022-05-03 NOTE — PROGRESS NOTES
States she was fine up till easter. States bend over to  something up and felt a shooting pain. Has gotten better in the neck and lower back.  States she still has tightness         Some times she gets n/t in her hands and feet

## 2022-05-03 NOTE — PROGRESS NOTES
[facility]   Follow-up      HISTORY OF PRESENT Lisbeth Wooten is a 28year old RH female returns in follow-up. She is doing well since her last visit last June. PT has been helping her back. She states around [place] she was dog sitting at someone's house and just bent forward with no lifting and felt something in her torso that radiated up to her neck. After that she had difficulty turning her neck she has stiffness in her neck numbness and all 5 fingers and toes of both hands and feet. She has had tightness in her lower back. Is in this capacity that she is here. She denies neck pain. But she does have tightness in the neck and feels like something is catching she has difficulty with rotation and is more to the right. Her finger numbness and tingling is intermittent. She denies any arm pain or weakness in the arms. She has no back pain but tightness in the back she denies any leg pain but intermittent numbness and tingling in the toes. No cramping in the legs no weakness in the legs no bowel or bladder incontinence. She has had an evaluation by chiropractor but no treatment. She does have the Medrol Dosepak but has not started it.      6/25/2021  Since her last visit she did take the Medrol Dosepak she was on NSAIDs for couple of weeks she has been in physical therapy currently she has no back pain. She denies any leg pain she has had no flareups but she has twisted and felt a little bit of sensation from time to time but overall she is significantly improved and feels very good. She has been lifting books and doing activities at work without flareups. 5/4/2021  gives a history of having eye standing in February and did not move a lot of furniture. In the process of moving furniture she has some right lower back pain. She was seen March 20 in urgent care she was placed on a Medrol Dosepak and Flexeril which helped significantly.   The pain returned she was back in the emergency room at the same pain on April 28 she has been using Lidoderm patches and Biofreeze which has helped. It is in this capacity that she is here. 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 She denies cervical pain cervical radiculopathy or weakness in the arms or numbness in the arms. Her lower back pain is a 4-5 over 10 is worse with sitting and leaning is worse with lifting is worse than lumbar extension. She denies any numbness tingling or weakness in the legs denies any heaviness or cramping in the legs no bowel or bladder incontinence. Currently taking over-the-counter Aleve and Advil. PHYSICAL EXAMINATION:  GENERAL:  Patient is in no acute distress. HEENT:  Normocephalic, atraumatic  SKIN: Warm, dry, no rashes. NEUROLOGICAL:  This patient is alert and orientated x 3. Speech fluent. Comprehension intact. Face is symmetrical.    SPINE:  Sensation to light touch is intact bilateral in both legs except for the fingers and toes. Gait intact. Patient can heel and toe walk. Negative straight leg raise bilaterally. She has tightness in the low back on flexion but no pain. She has tightness on extension but no pain. She has pain or tightness with range of motion especially rotation to the right. Positive Aj's bilaterally. Bilateral clonus.   Upper extremity strength:*Strength improves on cervical extension declines on neutral on flexion      Deltoid    Triceps     Biceps        Wrist Extension  Finger extension Thumb Abduction  Thumb adduction Intrinsics   Right         4*        5         5          5 5 4* 4 5 4*   Left         4*        5         5          5 5 4* 4 5 4*     Lower extremity strength:     Iliopsoas  Hamstrings   Quads    D-flexion P-flexion Eversion   Right       5         5       5         5 5    Left       5         5       5         5 5      Reflexes DTRs: 2/4 upper extremities, 3/4 at the Achilles and at the patella      IMAGING:  X-rays of the lumbar spine show 5 lumbar type vertebra in AP projection she has spondylosis which is moderate at L5-S1. Unchanged from the CT of the abdomen from 2019    CT of the abdomen from 2019 shows again 5 lumbar type vertebrae spondylosis at L5-S1.    ASSESSMENT:  -L5-S1 spondylosis  -Cervical stenosis with myelopathy  -Numbness and tingling in the hands and feet  -Weakness in the upper extremities    PLAN:  -Medrol Dosepak  -She may have chiropractic care but no manipulation  -X-rays of cervical spine, MRI of the cervical spine  -Follow-up after imaging    CHUCHO Spangler M.S., PA-C  [address], [address] [facility] Debra Morgan, [address]  [phone]

## 2022-05-10 ENCOUNTER — HOSPITAL ENCOUNTER (OUTPATIENT)
Dept: MRI IMAGING | Age: 32
Discharge: HOME | End: 2022-05-10
Attending: PHYSICIAN ASSISTANT
Payer: COMMERCIAL

## 2022-05-10 DIAGNOSIS — R29.898 WEAKNESS OF BOTH UPPER EXTREMITIES: ICD-10-CM

## 2022-05-10 DIAGNOSIS — M54.12 CERVICAL MYELOPATHY WITH CERVICAL RADICULOPATHY (HCC): ICD-10-CM

## 2022-05-10 DIAGNOSIS — G95.9 CERVICAL MYELOPATHY WITH CERVICAL RADICULOPATHY (HCC): ICD-10-CM

## 2022-05-10 PROCEDURE — 72141 MRI NECK SPINE W/O DYE: CPT | Performed by: PHYSICIAN ASSISTANT

## 2022-05-12 ENCOUNTER — HOSPITAL ENCOUNTER (OUTPATIENT)
Dept: GENERAL RADIOLOGY | Age: 32
Discharge: HOME | End: 2022-05-12
Attending: PHYSICIAN ASSISTANT
Payer: COMMERCIAL

## 2022-05-12 DIAGNOSIS — M54.12 CERVICAL MYELOPATHY WITH CERVICAL RADICULOPATHY (HCC): ICD-10-CM

## 2022-05-12 DIAGNOSIS — G95.9 CERVICAL MYELOPATHY WITH CERVICAL RADICULOPATHY (HCC): ICD-10-CM

## 2022-05-12 DIAGNOSIS — R29.898 WEAKNESS OF BOTH UPPER EXTREMITIES: ICD-10-CM

## 2022-05-12 PROCEDURE — 72052 X-RAY EXAM NECK SPINE 6/>VWS: CPT | Performed by: PHYSICIAN ASSISTANT

## 2022-05-18 ENCOUNTER — APPOINTMENT (OUTPATIENT)
Dept: MRI IMAGING | Facility: HOSPITAL | Age: 32
End: 2022-05-18
Attending: EMERGENCY MEDICINE
Payer: COMMERCIAL

## 2022-05-18 ENCOUNTER — HOSPITAL ENCOUNTER (EMERGENCY)
Facility: HOSPITAL | Age: 32
Discharge: HOME | End: 2022-05-18
Attending: EMERGENCY MEDICINE
Payer: COMMERCIAL

## 2022-05-18 VITALS
WEIGHT: 230 LBS | TEMPERATURE: 97 F | RESPIRATION RATE: 18 BRPM | HEIGHT: 62 IN | SYSTOLIC BLOOD PRESSURE: 140 MMHG | OXYGEN SATURATION: 98 % | BODY MASS INDEX: 42.33 KG/M2 | HEART RATE: 91 BPM | DIASTOLIC BLOOD PRESSURE: 102 MMHG

## 2022-05-18 DIAGNOSIS — R20.2 PARESTHESIAS: Primary | ICD-10-CM

## 2022-05-18 LAB
ALBUMIN SERPL-MCNC: 3.4 G/DL (ref 3.4–5)
ALBUMIN/GLOB SERPL: 0.8 {RATIO} (ref 1–2)
ALP LIVER SERPL-CCNC: 50 U/L
ALT SERPL-CCNC: 17 U/L
ANION GAP SERPL CALC-SCNC: 7 MMOL/L (ref 0–18)
AST SERPL-CCNC: 13 U/L (ref 15–37)
BASOPHILS # BLD AUTO: 0.04 X10(3) UL (ref 0–0.2)
BASOPHILS NFR BLD AUTO: 0.5 %
BILIRUB SERPL-MCNC: 0.2 MG/DL (ref 0.1–2)
BUN BLD-MCNC: 10 MG/DL (ref 7–18)
CALCIUM BLD-MCNC: 9 MG/DL (ref 8.5–10.1)
CHLORIDE SERPL-SCNC: 104 MMOL/L (ref 98–112)
CO2 SERPL-SCNC: 25 MMOL/L (ref 21–32)
CREAT BLD-MCNC: 0.7 MG/DL
EOSINOPHIL # BLD AUTO: 0.04 X10(3) UL (ref 0–0.7)
EOSINOPHIL NFR BLD AUTO: 0.5 %
ERYTHROCYTE [DISTWIDTH] IN BLOOD BY AUTOMATED COUNT: 12.4 %
EST. AVERAGE GLUCOSE BLD GHB EST-MCNC: 103 MG/DL (ref 68–126)
GLOBULIN PLAS-MCNC: 4.5 G/DL (ref 2.8–4.4)
GLUCOSE BLD-MCNC: 89 MG/DL (ref 70–99)
HBA1C MFR BLD: 5.2 % (ref ?–5.7)
HCG SERPL QL: NEGATIVE
HCT VFR BLD AUTO: 40.5 %
HGB BLD-MCNC: 12.8 G/DL
IMM GRANULOCYTES # BLD AUTO: 0.03 X10(3) UL (ref 0–1)
IMM GRANULOCYTES NFR BLD: 0.4 %
LYMPHOCYTES # BLD AUTO: 2.32 X10(3) UL (ref 1–4)
LYMPHOCYTES NFR BLD AUTO: 27.6 %
MCH RBC QN AUTO: 29 PG (ref 26–34)
MCHC RBC AUTO-ENTMCNC: 31.6 G/DL (ref 31–37)
MCV RBC AUTO: 91.6 FL
MONOCYTES # BLD AUTO: 0.7 X10(3) UL (ref 0.1–1)
MONOCYTES NFR BLD AUTO: 8.3 %
NEUTROPHILS # BLD AUTO: 5.29 X10 (3) UL (ref 1.5–7.7)
NEUTROPHILS # BLD AUTO: 5.29 X10(3) UL (ref 1.5–7.7)
NEUTROPHILS NFR BLD AUTO: 62.7 %
OSMOLALITY SERPL CALC.SUM OF ELEC: 281 MOSM/KG (ref 275–295)
PLATELET # BLD AUTO: 257 10(3)UL (ref 150–450)
POTASSIUM SERPL-SCNC: 3.7 MMOL/L (ref 3.5–5.1)
PROT SERPL-MCNC: 7.9 G/DL (ref 6.4–8.2)
RBC # BLD AUTO: 4.42 X10(6)UL
SODIUM SERPL-SCNC: 136 MMOL/L (ref 136–145)
TSI SER-ACNC: 1.41 MIU/ML (ref 0.36–3.74)
VIT B12 SERPL-MCNC: 148 PG/ML (ref 193–986)
WBC # BLD AUTO: 8.4 X10(3) UL (ref 4–11)

## 2022-05-18 PROCEDURE — 83036 HEMOGLOBIN GLYCOSYLATED A1C: CPT | Performed by: EMERGENCY MEDICINE

## 2022-05-18 PROCEDURE — 85025 COMPLETE CBC W/AUTO DIFF WBC: CPT | Performed by: EMERGENCY MEDICINE

## 2022-05-18 PROCEDURE — 80053 COMPREHEN METABOLIC PANEL: CPT | Performed by: EMERGENCY MEDICINE

## 2022-05-18 PROCEDURE — 70551 MRI BRAIN STEM W/O DYE: CPT | Performed by: EMERGENCY MEDICINE

## 2022-05-18 PROCEDURE — 82607 VITAMIN B-12: CPT | Performed by: EMERGENCY MEDICINE

## 2022-05-18 PROCEDURE — 84703 CHORIONIC GONADOTROPIN ASSAY: CPT | Performed by: EMERGENCY MEDICINE

## 2022-05-18 PROCEDURE — 99285 EMERGENCY DEPT VISIT HI MDM: CPT

## 2022-05-18 PROCEDURE — 84443 ASSAY THYROID STIM HORMONE: CPT | Performed by: EMERGENCY MEDICINE

## 2022-05-18 PROCEDURE — 36415 COLL VENOUS BLD VENIPUNCTURE: CPT

## 2022-05-18 PROCEDURE — 99284 EMERGENCY DEPT VISIT MOD MDM: CPT

## 2022-05-18 NOTE — ED INITIAL ASSESSMENT (HPI)
Patient presents for evaluation of feeling \"off\" that started this morning. She reports bilateral numbness/tingling to hands and feet. She states she has had imaging done recently for upper back pain.

## 2022-05-19 ENCOUNTER — OFFICE VISIT (OUTPATIENT)
Dept: NEUROLOGY | Facility: CLINIC | Age: 32
End: 2022-05-19
Payer: COMMERCIAL

## 2022-05-19 VITALS
SYSTOLIC BLOOD PRESSURE: 112 MMHG | RESPIRATION RATE: 16 BRPM | DIASTOLIC BLOOD PRESSURE: 84 MMHG | HEIGHT: 62 IN | HEART RATE: 85 BPM | WEIGHT: 233 LBS | BODY MASS INDEX: 42.88 KG/M2

## 2022-05-19 DIAGNOSIS — R20.2 PARESTHESIAS: ICD-10-CM

## 2022-05-19 DIAGNOSIS — R90.89 ABNORMAL FINDING ON MRI OF BRAIN: ICD-10-CM

## 2022-05-19 DIAGNOSIS — E53.8 VITAMIN B12 DEFICIENCY: Primary | ICD-10-CM

## 2022-05-19 DIAGNOSIS — G43.009 MIGRAINE WITHOUT AURA AND WITHOUT STATUS MIGRAINOSUS, NOT INTRACTABLE: ICD-10-CM

## 2022-05-19 PROCEDURE — 99213 OFFICE O/P EST LOW 20 MIN: CPT | Performed by: PHYSICIAN ASSISTANT

## 2022-05-19 PROCEDURE — 3074F SYST BP LT 130 MM HG: CPT | Performed by: PHYSICIAN ASSISTANT

## 2022-05-19 PROCEDURE — 3079F DIAST BP 80-89 MM HG: CPT | Performed by: PHYSICIAN ASSISTANT

## 2022-05-19 PROCEDURE — 3008F BODY MASS INDEX DOCD: CPT | Performed by: PHYSICIAN ASSISTANT

## 2022-05-25 ENCOUNTER — TELEPHONE (OUTPATIENT)
Dept: INTERNAL MEDICINE CLINIC | Facility: CLINIC | Age: 32
End: 2022-05-25

## 2022-05-25 ENCOUNTER — OFFICE VISIT (OUTPATIENT)
Dept: INTERNAL MEDICINE CLINIC | Facility: CLINIC | Age: 32
End: 2022-05-25
Payer: COMMERCIAL

## 2022-05-25 VITALS
DIASTOLIC BLOOD PRESSURE: 76 MMHG | OXYGEN SATURATION: 100 % | HEART RATE: 82 BPM | SYSTOLIC BLOOD PRESSURE: 114 MMHG | HEIGHT: 62 IN | WEIGHT: 234 LBS | RESPIRATION RATE: 16 BRPM | TEMPERATURE: 98 F | BODY MASS INDEX: 43.06 KG/M2

## 2022-05-25 DIAGNOSIS — E66.01 MORBID OBESITY WITH BMI OF 45.0-49.9, ADULT (HCC): ICD-10-CM

## 2022-05-25 DIAGNOSIS — K21.9 GASTROESOPHAGEAL REFLUX DISEASE WITHOUT ESOPHAGITIS: ICD-10-CM

## 2022-05-25 DIAGNOSIS — E53.8 VITAMIN B12 DEFICIENCY: Primary | ICD-10-CM

## 2022-05-25 PROCEDURE — 3078F DIAST BP <80 MM HG: CPT | Performed by: INTERNAL MEDICINE

## 2022-05-25 PROCEDURE — 96372 THER/PROPH/DIAG INJ SC/IM: CPT | Performed by: INTERNAL MEDICINE

## 2022-05-25 PROCEDURE — 3074F SYST BP LT 130 MM HG: CPT | Performed by: INTERNAL MEDICINE

## 2022-05-25 PROCEDURE — 3008F BODY MASS INDEX DOCD: CPT | Performed by: INTERNAL MEDICINE

## 2022-05-25 PROCEDURE — 99214 OFFICE O/P EST MOD 30 MIN: CPT | Performed by: INTERNAL MEDICINE

## 2022-05-25 RX ORDER — CYANOCOBALAMIN 1000 UG/ML
1000 INJECTION INTRAMUSCULAR; SUBCUTANEOUS WEEKLY
Status: SHIPPED | OUTPATIENT
Start: 2022-05-25 | End: 2022-08-17

## 2022-05-25 RX ORDER — SEMAGLUTIDE 1 MG/.5ML
1 INJECTION, SOLUTION SUBCUTANEOUS WEEKLY
Qty: 4 EACH | Refills: 2 | Status: SHIPPED | OUTPATIENT
Start: 2022-05-25

## 2022-05-25 RX ADMIN — CYANOCOBALAMIN 1000 MCG: 1000 INJECTION INTRAMUSCULAR; SUBCUTANEOUS at 16:27:00

## 2022-05-25 NOTE — TELEPHONE ENCOUNTER
Per notes \"Will have her start vitamin B12 injections 1000mcg weekly x 1 month then every 2 weeks x 2 months and repeat a level. \" one shot given today.     Orders needed for the following:  Future Appointments   Date Time Provider Wandy Brizuela   6/2/2022  4:30 PM [facility] 08 NURSE [facility] 8 [facility] [place]     6/9/2022  4:30 PM [facility] 08 NURSE [facility] 8 [facility] [place]   6/16/2022  4:30 PM [facility] 08 NURSE [facility] 8 [facility] [place]   6/30/2022  4:30 PM [facility] 08 NURSE [facility] 8 [facility] [place]   7/14/2022  4:30 PM [facility] 08 NURSE [facility] 8 [facility] [place]   7/28/2022  4:30 PM [facility] 08 NURSE [facility] 8 [facility] [place]   8/11/2022  4:30 PM [facility] 08 NURSE [facility] 8 [facility] [place]

## 2022-05-26 RX ORDER — CYANOCOBALAMIN 1000 UG/ML
1000 INJECTION INTRAMUSCULAR; SUBCUTANEOUS ONCE
Status: SHIPPED | OUTPATIENT
Start: 2022-06-30

## 2022-05-26 RX ORDER — CYANOCOBALAMIN 1000 UG/ML
1000 INJECTION INTRAMUSCULAR; SUBCUTANEOUS ONCE
Status: SHIPPED | OUTPATIENT
Start: 2022-06-16

## 2022-05-26 RX ORDER — CYANOCOBALAMIN 1000 UG/ML
1000 INJECTION INTRAMUSCULAR; SUBCUTANEOUS ONCE
Status: SHIPPED | OUTPATIENT
Start: 2022-06-02

## 2022-05-26 RX ORDER — CYANOCOBALAMIN 1000 UG/ML
1000 INJECTION INTRAMUSCULAR; SUBCUTANEOUS ONCE
Status: SHIPPED | OUTPATIENT
Start: 2022-06-09

## 2022-05-26 RX ORDER — CYANOCOBALAMIN 1000 UG/ML
1000 INJECTION INTRAMUSCULAR; SUBCUTANEOUS ONCE
Status: SHIPPED | OUTPATIENT
Start: 2022-07-28

## 2022-05-26 RX ORDER — CYANOCOBALAMIN 1000 UG/ML
1000 INJECTION INTRAMUSCULAR; SUBCUTANEOUS ONCE
Status: SHIPPED | OUTPATIENT
Start: 2022-07-14

## 2022-05-26 RX ORDER — CYANOCOBALAMIN 1000 UG/ML
1000 INJECTION INTRAMUSCULAR; SUBCUTANEOUS ONCE
Status: SHIPPED | OUTPATIENT
Start: 2022-08-11

## 2022-06-02 ENCOUNTER — NURSE ONLY (OUTPATIENT)
Dept: INTERNAL MEDICINE CLINIC | Facility: CLINIC | Age: 32
End: 2022-06-02
Payer: COMMERCIAL

## 2022-06-02 ENCOUNTER — TELEPHONE (OUTPATIENT)
Dept: NEUROLOGY | Facility: CLINIC | Age: 32
End: 2022-06-02

## 2022-06-02 PROCEDURE — 96372 THER/PROPH/DIAG INJ SC/IM: CPT | Performed by: INTERNAL MEDICINE

## 2022-06-02 RX ADMIN — CYANOCOBALAMIN 1000 MCG: 1000 INJECTION INTRAMUSCULAR; SUBCUTANEOUS at 16:26:00

## 2022-06-03 ENCOUNTER — HOSPITAL ENCOUNTER (OUTPATIENT)
Dept: CV DIAGNOSTICS | Facility: HOSPITAL | Age: 32
Discharge: HOME | End: 2022-06-03
Attending: PHYSICIAN ASSISTANT
Payer: COMMERCIAL

## 2022-06-03 ENCOUNTER — OFFICE VISIT (OUTPATIENT)
Dept: SURGERY | Facility: CLINIC | Age: 32
End: 2022-06-03
Payer: COMMERCIAL

## 2022-06-03 VITALS — HEART RATE: 90 BPM | DIASTOLIC BLOOD PRESSURE: 80 MMHG | SYSTOLIC BLOOD PRESSURE: 100 MMHG

## 2022-06-03 DIAGNOSIS — G95.9 CERVICAL MYELOPATHY WITH CERVICAL RADICULOPATHY (HCC): Primary | ICD-10-CM

## 2022-06-03 DIAGNOSIS — M47.816 LUMBAR SPONDYLOSIS: ICD-10-CM

## 2022-06-03 DIAGNOSIS — R29.898 WEAKNESS OF BOTH UPPER EXTREMITIES: ICD-10-CM

## 2022-06-03 DIAGNOSIS — M54.12 CERVICAL MYELOPATHY WITH CERVICAL RADICULOPATHY (HCC): Primary | ICD-10-CM

## 2022-06-03 DIAGNOSIS — G89.29 CHRONIC MIDLINE LOW BACK PAIN WITHOUT SCIATICA: ICD-10-CM

## 2022-06-03 DIAGNOSIS — M54.50 ACUTE BILATERAL LOW BACK PAIN WITHOUT SCIATICA: ICD-10-CM

## 2022-06-03 DIAGNOSIS — M54.50 CHRONIC MIDLINE LOW BACK PAIN WITHOUT SCIATICA: ICD-10-CM

## 2022-06-03 DIAGNOSIS — R90.89 ABNORMAL FINDING ON MRI OF BRAIN: ICD-10-CM

## 2022-06-03 PROCEDURE — 93306 TTE W/DOPPLER COMPLETE: CPT | Performed by: PHYSICIAN ASSISTANT

## 2022-06-03 PROCEDURE — 99214 OFFICE O/P EST MOD 30 MIN: CPT | Performed by: PHYSICIAN ASSISTANT

## 2022-06-03 PROCEDURE — 3079F DIAST BP 80-89 MM HG: CPT | Performed by: PHYSICIAN ASSISTANT

## 2022-06-03 PROCEDURE — 3074F SYST BP LT 130 MM HG: CPT | Performed by: PHYSICIAN ASSISTANT

## 2022-06-03 NOTE — PROGRESS NOTES
Pt states its better, back is feeling better. States she has the n/t in her feet and hands, states she started B12 shots last week and the n/t has gotten some what better.

## 2022-06-03 NOTE — PROGRESS NOTES
[facility]   Follow-up      HISTORY OF PRESENT Marlena Hummel is a 33year old RH female returns in follow-up. Since her last visit she was seen in the emergency room for bilateral hand numbness and tingling. She had an MRI of the brain May 18 which was negative. She was recommended to follow-up with neurology. She has an appointment September 8 with Dr. Jethro Carr. Patient returns in follow-up. She has no neck pain she has no back pain. She did take the Medrol Dosepak it seemed to subside her pain but not her numbness and tingling. She been getting B12 shots with neurology that does seem to help her numbness and tingling in her hands and feet. She has had 1 episode of some weakness in the legs and feeling off as what prompted her to go to the ER.    5/3/22  She is doing well since her last visit last June. PT has been helping her back. She states around [place] she was dog sitting at someone's house and just bent forward with no lifting and felt something in her torso that radiated up to her neck. After that she had difficulty turning her neck she has stiffness in her neck numbness and all 5 fingers and toes of both hands and feet. She has had tightness in her lower back. Is in this capacity that she is here. She denies neck pain. But she does have tightness in the neck and feels like something is catching she has difficulty with rotation and is more to the right. Her finger numbness and tingling is intermittent. She denies any arm pain or weakness in the arms. She has no back pain but tightness in the back she denies any leg pain but intermittent numbness and tingling in the toes. No cramping in the legs no weakness in the legs no bowel or bladder incontinence. She has had an evaluation by chiropractor but no treatment.   She does have the Medrol Dosepak but has not started it.      6/25/2021  Since her last visit she did take the Medrol Dosepak she was on NSAIDs for couple of weeks she has been in physical therapy currently she has no back pain. She denies any leg pain she has had no flareups but she has twisted and felt a little bit of sensation from time to time but overall she is significantly improved and feels very good. She has been lifting books and doing activities at work without flareups. 5/4/2021  gives a history of having eye standing in February and did not move a lot of furniture. In the process of moving furniture she has some right lower back pain. She was seen March 20 in urgent care she was placed on a Medrol Dosepak and Flexeril which helped significantly. The pain returned she was back in the emergency room at the same pain on April 28 she has been using Lidoderm patches and Biofreeze which has helped. It is in this capacity that she is here. She works at a [employer] in [place]. She denies cervical pain cervical radiculopathy or weakness in the arms or numbness in the arms. Her lower back pain is a 4-5 over 10 is worse with sitting and leaning is worse with lifting is worse than lumbar extension. She denies any numbness tingling or weakness in the legs denies any heaviness or cramping in the legs no bowel or bladder incontinence. Currently taking over-the-counter Aleve and Advil. PHYSICAL EXAMINATION:  GENERAL:  Patient is in no acute distress. HEENT:  Normocephalic, atraumatic  SKIN: Warm, dry, no rashes. NEUROLOGICAL:  This patient is alert and orientated x 3. Speech fluent. Comprehension intact. Face is symmetrical.    SPINE:  Sensation to light touch is intact bilateral in both legs except for the fingers and toes. Gait intact. Patient can heel and toe walk. Negative straight leg raise bilaterally. Positive Aj's on the right, on the left has resolved. Resolved bilateral clonus.   Upper extremity strength:*Improved strength      Deltoid    Triceps     Biceps        Wrist Extension  Finger extension Thumb Abduction  Thumb adduction Intrinsics   Right         5*        5         5          5 5 5* 5-* 5 4   Left         5*        5         5          5 5 5* 5-* 5 4     Lower extremity strength:     Iliopsoas  Hamstrings   Quads    D-flexion P-flexion Eversion   Right       5         5       5         5 5    Left       5         5       5         5 5      Reflexes DTRs: 2/4 upper extremities, 3/4 at the Achilles and at the patella      IMAGING:  X-rays of the lumbar spine show 5 lumbar type vertebra in AP projection she has spondylosis which is moderate at L5-S1. Unchanged from the CT of the abdomen from 2019    CT of the abdomen from 2019 shows again 5 lumbar type vertebrae spondylosis at L5-S1. X-ray of the cervical spine from 5/12/2022 shows loss of lordosis. Her neck is visualized C1-C7. No abnormal motion is appreciated on flexion. 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 She has a C5-6 central disc herniation causing anterior cord deformity. She has a right C7-T1 disc herniation with no cord contact. MRI of the brain from 5/18/2022 was normal    ASSESSMENT:  -L5-S1 spondylosis with low back pain  -Cervical stenosis C5-6 with myelopathy  -Numbness and tingling in the hands and feet  -Weakness in the upper extremities  -C3-4 C4-5 slight retrolisthesis which may be anatomical  -Right C7-T1 disc herniation without radiculopathy    PLAN:  -Wall glides we may consider physical therapy  -Limited forward flexed posture is much as possible. -If she has an acute flareup then we will consider another round of steroids  -She may have chiropractic care but no manipulation  -Follow-up 3 months  -Follow-up with neurology as requested  -Images were reviewed her questions were answered    T. Charlie Shone, M.S., JOVANNY MARK [address] Brett Barn[place], [address] [facility]  [phone]

## 2022-06-09 ENCOUNTER — NURSE ONLY (OUTPATIENT)
Dept: INTERNAL MEDICINE CLINIC | Facility: CLINIC | Age: 32
End: 2022-06-09
Payer: COMMERCIAL

## 2022-06-09 PROCEDURE — 96372 THER/PROPH/DIAG INJ SC/IM: CPT | Performed by: INTERNAL MEDICINE

## 2022-06-09 RX ADMIN — CYANOCOBALAMIN 1000 MCG: 1000 INJECTION INTRAMUSCULAR; SUBCUTANEOUS at 16:32:00

## 2022-06-16 ENCOUNTER — NURSE ONLY (OUTPATIENT)
Dept: INTERNAL MEDICINE CLINIC | Facility: CLINIC | Age: 32
End: 2022-06-16
Payer: COMMERCIAL

## 2022-06-16 PROCEDURE — 96372 THER/PROPH/DIAG INJ SC/IM: CPT | Performed by: INTERNAL MEDICINE

## 2022-06-16 RX ADMIN — CYANOCOBALAMIN 1000 MCG: 1000 INJECTION INTRAMUSCULAR; SUBCUTANEOUS at 16:36:00

## 2022-06-27 DIAGNOSIS — E66.01 MORBID OBESITY WITH BMI OF 45.0-49.9, ADULT (HCC): Primary | ICD-10-CM

## 2022-06-30 ENCOUNTER — NURSE ONLY (OUTPATIENT)
Dept: INTERNAL MEDICINE CLINIC | Facility: CLINIC | Age: 32
End: 2022-06-30
Payer: COMMERCIAL

## 2022-06-30 PROCEDURE — 96372 THER/PROPH/DIAG INJ SC/IM: CPT | Performed by: INTERNAL MEDICINE

## 2022-06-30 RX ADMIN — CYANOCOBALAMIN 1000 MCG: 1000 INJECTION INTRAMUSCULAR; SUBCUTANEOUS at 16:24:00

## 2022-07-01 ENCOUNTER — OFFICE VISIT (OUTPATIENT)
Dept: INTERNAL MEDICINE CLINIC | Facility: CLINIC | Age: 32
End: 2022-07-01
Payer: COMMERCIAL

## 2022-07-01 VITALS
SYSTOLIC BLOOD PRESSURE: 112 MMHG | TEMPERATURE: 99 F | DIASTOLIC BLOOD PRESSURE: 76 MMHG | RESPIRATION RATE: 16 BRPM | BODY MASS INDEX: 42.99 KG/M2 | WEIGHT: 233.63 LBS | HEART RATE: 94 BPM | HEIGHT: 62 IN | OXYGEN SATURATION: 100 %

## 2022-07-01 DIAGNOSIS — E66.01 MORBID OBESITY WITH BMI OF 45.0-49.9, ADULT (HCC): ICD-10-CM

## 2022-07-01 DIAGNOSIS — M25.561 ACUTE PAIN OF RIGHT KNEE: Primary | ICD-10-CM

## 2022-07-01 PROCEDURE — 3008F BODY MASS INDEX DOCD: CPT | Performed by: INTERNAL MEDICINE

## 2022-07-01 PROCEDURE — 3078F DIAST BP <80 MM HG: CPT | Performed by: INTERNAL MEDICINE

## 2022-07-01 PROCEDURE — 99213 OFFICE O/P EST LOW 20 MIN: CPT | Performed by: INTERNAL MEDICINE

## 2022-07-01 PROCEDURE — 3074F SYST BP LT 130 MM HG: CPT | Performed by: INTERNAL MEDICINE

## 2022-07-01 RX ORDER — PEN NEEDLE, DIABETIC 30 GX3/16"
1 NEEDLE, DISPOSABLE MISCELLANEOUS
Qty: 30 EACH | Refills: 1 | Status: SHIPPED | OUTPATIENT
Start: 2022-07-01

## 2022-07-01 RX ORDER — SEMAGLUTIDE 1.34 MG/ML
1 INJECTION, SOLUTION SUBCUTANEOUS
Qty: 3 ML | Refills: 1 | Status: SHIPPED | OUTPATIENT
Start: 2022-07-01

## 2022-07-05 ENCOUNTER — PATIENT MESSAGE (OUTPATIENT)
Dept: INTERNAL MEDICINE CLINIC | Facility: CLINIC | Age: 32
End: 2022-07-05

## 2022-07-05 NOTE — TELEPHONE ENCOUNTER
From: Familia Cabello  To: 32 hospitals,   Sent: 7/5/2022 1:53 PM CDT  Subject: Radha Davila Show,    I am wondering if you could recommend an OBGYN out of [facility] for us? Dr. Kathy Villatoro has passed away.     Thank you,  Jeanna Regan

## 2022-07-07 ENCOUNTER — TELEPHONE (OUTPATIENT)
Dept: INTERNAL MEDICINE CLINIC | Facility: CLINIC | Age: 32
End: 2022-07-07

## 2022-07-07 NOTE — TELEPHONE ENCOUNTER
Incoming fax from [email or web address] with PA for ozempic   Completed to best of my ability   Placed in [facility] for completion

## 2022-07-08 NOTE — TELEPHONE ENCOUNTER
Prior authorization form for Ozempic faxed to [place] at #[phone]. Awaiting prior authorization outcome.

## 2022-07-14 ENCOUNTER — NURSE ONLY (OUTPATIENT)
Dept: INTERNAL MEDICINE CLINIC | Facility: CLINIC | Age: 32
End: 2022-07-14
Payer: COMMERCIAL

## 2022-07-14 PROCEDURE — 96372 THER/PROPH/DIAG INJ SC/IM: CPT | Performed by: INTERNAL MEDICINE

## 2022-07-14 RX ADMIN — CYANOCOBALAMIN 1000 MCG: 1000 INJECTION INTRAMUSCULAR; SUBCUTANEOUS at 16:23:00

## 2022-07-28 ENCOUNTER — NURSE ONLY (OUTPATIENT)
Dept: INTERNAL MEDICINE CLINIC | Facility: CLINIC | Age: 32
End: 2022-07-28
Payer: COMMERCIAL

## 2022-07-28 PROCEDURE — 96372 THER/PROPH/DIAG INJ SC/IM: CPT | Performed by: INTERNAL MEDICINE

## 2022-07-28 RX ADMIN — CYANOCOBALAMIN 1000 MCG: 1000 INJECTION INTRAMUSCULAR; SUBCUTANEOUS at 16:27:00

## 2022-08-03 RX ORDER — MONTELUKAST SODIUM 10 MG/1
TABLET ORAL
Qty: 90 TABLET | Refills: 0 | Status: SHIPPED | OUTPATIENT
Start: 2022-08-03

## 2022-08-11 ENCOUNTER — NURSE ONLY (OUTPATIENT)
Dept: INTERNAL MEDICINE CLINIC | Facility: CLINIC | Age: 32
End: 2022-08-11
Payer: COMMERCIAL

## 2022-08-11 PROCEDURE — 96372 THER/PROPH/DIAG INJ SC/IM: CPT | Performed by: INTERNAL MEDICINE

## 2022-08-11 RX ADMIN — CYANOCOBALAMIN 1000 MCG: 1000 INJECTION INTRAMUSCULAR; SUBCUTANEOUS at 16:25:00

## 2022-09-08 ENCOUNTER — OFFICE VISIT (OUTPATIENT)
Dept: NEUROLOGY | Facility: CLINIC | Age: 32
End: 2022-09-08
Payer: COMMERCIAL

## 2022-09-08 ENCOUNTER — OFFICE VISIT (OUTPATIENT)
Dept: SURGERY | Facility: CLINIC | Age: 32
End: 2022-09-08
Payer: COMMERCIAL

## 2022-09-08 ENCOUNTER — LAB ENCOUNTER (OUTPATIENT)
Dept: LAB | Age: 32
End: 2022-09-08
Attending: PHYSICIAN ASSISTANT
Payer: COMMERCIAL

## 2022-09-08 VITALS
HEART RATE: 87 BPM | HEIGHT: 62 IN | DIASTOLIC BLOOD PRESSURE: 84 MMHG | WEIGHT: 233 LBS | SYSTOLIC BLOOD PRESSURE: 134 MMHG | BODY MASS INDEX: 42.88 KG/M2 | RESPIRATION RATE: 16 BRPM

## 2022-09-08 VITALS — DIASTOLIC BLOOD PRESSURE: 84 MMHG | OXYGEN SATURATION: 97 % | HEART RATE: 100 BPM | SYSTOLIC BLOOD PRESSURE: 126 MMHG

## 2022-09-08 DIAGNOSIS — E53.8 VITAMIN B12 DEFICIENCY: ICD-10-CM

## 2022-09-08 DIAGNOSIS — R20.2 PARESTHESIAS: ICD-10-CM

## 2022-09-08 DIAGNOSIS — R90.89 ABNORMAL FINDING ON MRI OF BRAIN: ICD-10-CM

## 2022-09-08 DIAGNOSIS — G95.9 CERVICAL MYELOPATHY WITH CERVICAL RADICULOPATHY (HCC): Primary | ICD-10-CM

## 2022-09-08 DIAGNOSIS — M47.816 LUMBAR SPONDYLOSIS: ICD-10-CM

## 2022-09-08 DIAGNOSIS — M54.12 CERVICAL MYELOPATHY WITH CERVICAL RADICULOPATHY (HCC): Primary | ICD-10-CM

## 2022-09-08 DIAGNOSIS — M54.16 LUMBAR RADICULOPATHY: ICD-10-CM

## 2022-09-08 DIAGNOSIS — E53.8 VITAMIN B12 DEFICIENCY: Primary | ICD-10-CM

## 2022-09-08 LAB — VIT B12 SERPL-MCNC: 249 PG/ML (ref 193–986)

## 2022-09-08 PROCEDURE — 82607 VITAMIN B-12: CPT

## 2022-09-08 PROCEDURE — 3079F DIAST BP 80-89 MM HG: CPT | Performed by: PHYSICIAN ASSISTANT

## 2022-09-08 PROCEDURE — 3074F SYST BP LT 130 MM HG: CPT | Performed by: PHYSICIAN ASSISTANT

## 2022-09-08 PROCEDURE — 99214 OFFICE O/P EST MOD 30 MIN: CPT | Performed by: PHYSICIAN ASSISTANT

## 2022-09-08 NOTE — PROGRESS NOTES
[facility]   Follow-up      HISTORY OF PRESENT Jerrica Johnson is a 33year old RH female returns in follow-up. Overall she is doing very well. She currently has no neck pain she has no back pain no arm or leg pain. The B12 shots seem to help the numbness in the tips of her fingers and toes she currently has no numbness. She denies any weakness. Overall she is feeling fine she has been working on getting a sit/stand workstation but has not received it yet and is also requesting a letter. 6/3/2022  Since her last visit she was seen in the emergency room for bilateral hand numbness and tingling. She had an MRI of the brain May 18 which was negative. She was recommended to follow-up with neurology. She has an appointment September 8 with Dr. Roger Crane. Patient returns in follow-up. She has no neck pain she has no back pain. She did take the Medrol Dosepak it seemed to subside her pain but not her numbness and tingling. She been getting B12 shots with neurology that does seem to help her numbness and tingling in her hands and feet. She has had 1 episode of some weakness in the legs and feeling off as what prompted her to go to the ER.    5/3/22  She is doing well since her last visit last June. PT has been helping her back. She states around [place] she was dog sitting at someone's house and just bent forward with no lifting and felt something in her torso that radiated up to her neck. After that she had difficulty turning her neck she has stiffness in her neck numbness and all 5 fingers and toes of both hands and feet. She has had tightness in her lower back. Is in this capacity that she is here. She denies neck pain. But she does have tightness in the neck and feels like something is catching she has difficulty with rotation and is more to the right. Her finger numbness and tingling is intermittent. She denies any arm pain or weakness in the arms.   She has no back pain but tightness in the back she denies any leg pain but intermittent numbness and tingling in the toes. No cramping in the legs no weakness in the legs no bowel or bladder incontinence. She has had an evaluation by chiropractor but no treatment. She does have the Medrol Dosepak but has not started it.      6/25/2021  Since her last visit she did take the Medrol Dosepak she was on NSAIDs for couple of weeks she has been in physical therapy currently she has no back pain. She denies any leg pain she has had no flareups but she has twisted and felt a little bit of sensation from time to time but overall she is significantly improved and feels very good. She has been lifting books and doing activities at work without flareups. 5/4/2021  gives a history of having eye standing in February and did not move a lot of furniture. In the process of moving furniture she has some right lower back pain. She was seen March 20 in urgent care she was placed on a Medrol Dosepak and Flexeril which helped significantly. The pain returned she was back in the emergency room at the same pain on April 28 she has been using Lidoderm patches and Biofreeze which has helped. It is in this capacity that she is here. 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 She denies cervical pain cervical radiculopathy or weakness in the arms or numbness in the arms. Her lower back pain is a 4-5 over 10 is worse with sitting and leaning is worse with lifting is worse than lumbar extension. She denies any numbness tingling or weakness in the legs denies any heaviness or cramping in the legs no bowel or bladder incontinence. Currently taking over-the-counter Aleve and Advil. PHYSICAL EXAMINATION:  GENERAL:  Patient is in no acute distress. HEENT:  Normocephalic, atraumatic  SKIN: Warm, dry, no rashes. NEUROLOGICAL:  This patient is alert and orientated x 3. Speech fluent. Comprehension intact.   Face is symmetrical.    SPINE: Sensation to light touch is intact bilateral in both legs. Gait intact. Negative straight leg raise bilaterally. Positive Aj's on the right, on the left has resolved. Resolved bilateral clonus. No back pain on flexion extension. No neck pain on flexion extension or rotation  Upper extremity strength:*Improved strength      Deltoid    Triceps     Biceps        Wrist Extension  Finger extension Thumb Abduction  Thumb adduction Intrinsics   Right         5*        5         5          5 5 5* 5* 5 5*   Left         5*        5         5          5 5 5* 5* 5 5*     Lower extremity strength:     Iliopsoas  Hamstrings   Quads    D-flexion P-flexion Eversion   Right       5         5       5         5 5    Left       5         5       5         5 5      Reflexes DTRs: 2/4 upper extremities, 2/4 at the Achilles and at the patella      IMAGING:  X-rays of the lumbar spine show 5 lumbar type vertebra in AP projection she has spondylosis which is moderate at L5-S1. Unchanged from the CT of the abdomen from 2019    CT of the abdomen from 2019 shows again 5 lumbar type vertebrae spondylosis at L5-S1. X-ray of the cervical spine from 5/12/2022 shows loss of lordosis. Her neck is visualized C1-C7. No abnormal motion is appreciated on flexion. 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 She has a C5-6 central disc herniation causing anterior cord deformity. She has a right C7-T1 disc herniation with no cord contact.     MRI of the brain from 5/18/2022 was normal    ASSESSMENT:  -L5-S1 spondylosis with resolved low back pain  -Cervical stenosis C5-6 with resolved myelopathy  -Numbness in the hands and feet improved, likely secondary to B12 deficiency  -Weakness in the upper extremities  -C3-4 C4-5 slight retrolisthesis which may be anatomical  -Right C7-T1 disc herniation without radiculopathy    PLAN:  -Call with any changes  -She is given a letter for her work to provide a sit/stand workstation to prevent flareups of her cervical spine  -Follow-up as needed  -Follow-up with neurology as requested  - her questions were answered    TChidi Reyes M.S., JOVANNY MARK [facility]  [facility], [address] [place] [address] [place] Morgan, [address]  [phone]

## 2022-10-10 ENCOUNTER — TELEPHONE (OUTPATIENT)
Dept: OBGYN CLINIC | Facility: CLINIC | Age: 32
End: 2022-10-10

## 2022-10-10 ENCOUNTER — OFFICE VISIT (OUTPATIENT)
Dept: OBGYN CLINIC | Facility: CLINIC | Age: 32
End: 2022-10-10
Payer: COMMERCIAL

## 2022-10-10 VITALS
HEART RATE: 112 BPM | SYSTOLIC BLOOD PRESSURE: 128 MMHG | DIASTOLIC BLOOD PRESSURE: 80 MMHG | BODY MASS INDEX: 45.52 KG/M2 | HEIGHT: 62 IN | WEIGHT: 247.38 LBS

## 2022-10-10 DIAGNOSIS — Z30.09 GENERAL COUNSELING AND ADVICE FOR CONTRACEPTIVE MANAGEMENT: ICD-10-CM

## 2022-10-10 DIAGNOSIS — Z01.411 ENCOUNTER FOR WELL WOMAN EXAM WITH ABNORMAL FINDINGS: Primary | ICD-10-CM

## 2022-10-10 DIAGNOSIS — E66.01 MORBID OBESITY WITH BMI OF 45.0-49.9, ADULT (HCC): ICD-10-CM

## 2022-10-10 DIAGNOSIS — Z71.85 HPV VACCINE COUNSELING: ICD-10-CM

## 2022-10-10 DIAGNOSIS — Z12.4 SCREENING FOR CERVICAL CANCER: ICD-10-CM

## 2022-10-10 PROCEDURE — 87624 HPV HI-RISK TYP POOLED RSLT: CPT | Performed by: OBSTETRICS & GYNECOLOGY

## 2022-10-10 RX ORDER — DROSPIRENONE, ETHINYL ESTRADIOL AND LEVOMEFOLATE CALCIUM AND LEVOMEFOLATE CALCIUM 3-0.02(24)
KIT ORAL
Qty: 112 TABLET | Refills: 3 | Status: SHIPPED | OUTPATIENT
Start: 2022-10-10

## 2022-10-11 LAB — HPV I/H RISK 1 DNA SPEC QL NAA+PROBE: NEGATIVE

## 2022-10-20 ENCOUNTER — TELEPHONE (OUTPATIENT)
Dept: OBGYN CLINIC | Facility: CLINIC | Age: 32
End: 2022-10-20

## 2022-12-29 ENCOUNTER — PATIENT MESSAGE (OUTPATIENT)
Dept: INTERNAL MEDICINE CLINIC | Facility: CLINIC | Age: 32
End: 2022-12-29

## 2022-12-29 DIAGNOSIS — E55.9 VITAMIN D DEFICIENCY: ICD-10-CM

## 2022-12-29 DIAGNOSIS — E53.8 VITAMIN B12 DEFICIENCY: Primary | ICD-10-CM

## 2022-12-29 NOTE — TELEPHONE ENCOUNTER
From: Corinne Woods  To: 32 [facility],   Sent: 12/29/2022 4:25 PM CST  Subject: Blood Work    Hi Dr. Mckenzie Blanton you had a good holiday! Just wanted to check if an order was already requested to retest the vitamin b12 and d levels. If so I will go tomorrow for the blood work.

## 2022-12-31 ENCOUNTER — LAB ENCOUNTER (OUTPATIENT)
Dept: LAB | Facility: HOSPITAL | Age: 32
End: 2022-12-31
Attending: INTERNAL MEDICINE
Payer: COMMERCIAL

## 2022-12-31 DIAGNOSIS — E53.8 VITAMIN B12 DEFICIENCY: ICD-10-CM

## 2022-12-31 DIAGNOSIS — E55.9 VITAMIN D DEFICIENCY: ICD-10-CM

## 2022-12-31 LAB
VIT B12 SERPL-MCNC: 1657 PG/ML (ref 193–986)
VIT D+METAB SERPL-MCNC: 29.9 NG/ML (ref 30–100)

## 2022-12-31 PROCEDURE — 36415 COLL VENOUS BLD VENIPUNCTURE: CPT

## 2022-12-31 PROCEDURE — 82607 VITAMIN B-12: CPT

## 2022-12-31 PROCEDURE — 82306 VITAMIN D 25 HYDROXY: CPT

## 2023-01-03 ENCOUNTER — PATIENT MESSAGE (OUTPATIENT)
Dept: INTERNAL MEDICINE CLINIC | Facility: CLINIC | Age: 33
End: 2023-01-03

## 2023-01-03 NOTE — TELEPHONE ENCOUNTER
From: Severiano Mask  To: 32 [facility],   Sent: 1/3/2023 10:19 AM CST  Subject: Question regarding VITAMIN D, 25-HYDROXY    Hi Dr. Sudhakar Jones,    I just checked the dosage for the vitamin D and it is currently 5000IU I take daily.  Do I need to double that?    Loly Bond

## 2023-05-18 ENCOUNTER — OFFICE VISIT (OUTPATIENT)
Dept: FAMILY MEDICINE CLINIC | Facility: CLINIC | Age: 33
End: 2023-05-18
Payer: COMMERCIAL

## 2023-05-18 VITALS
RESPIRATION RATE: 16 BRPM | WEIGHT: 270 LBS | HEIGHT: 62 IN | DIASTOLIC BLOOD PRESSURE: 72 MMHG | SYSTOLIC BLOOD PRESSURE: 122 MMHG | TEMPERATURE: 98 F | BODY MASS INDEX: 49.69 KG/M2 | OXYGEN SATURATION: 98 % | HEART RATE: 118 BPM

## 2023-05-18 DIAGNOSIS — J06.9 URI WITH COUGH AND CONGESTION: Primary | ICD-10-CM

## 2023-05-18 PROCEDURE — 99213 OFFICE O/P EST LOW 20 MIN: CPT | Performed by: NURSE PRACTITIONER

## 2023-05-18 PROCEDURE — 3008F BODY MASS INDEX DOCD: CPT | Performed by: NURSE PRACTITIONER

## 2023-05-18 PROCEDURE — 3074F SYST BP LT 130 MM HG: CPT | Performed by: NURSE PRACTITIONER

## 2023-05-18 PROCEDURE — 3078F DIAST BP <80 MM HG: CPT | Performed by: NURSE PRACTITIONER

## 2023-05-18 NOTE — PATIENT INSTRUCTIONS
Tylenol and Motrin as needed  Rest, push fluids  Mucinex DM over the counter for nasal congestion/cough  START daily antihistamine (Zyrtec, Claritin, Allegra) and choose one of those.  Take daily for 1-2 weeks to help with any post nasal drainage/sore throat  Return to care for new/worsening symptoms or if symptoms persist for 2+ weeks without any improvement

## 2023-08-24 ENCOUNTER — TELEPHONE (OUTPATIENT)
Dept: INTERNAL MEDICINE CLINIC | Facility: CLINIC | Age: 33
End: 2023-08-24

## 2023-08-24 NOTE — TELEPHONE ENCOUNTER
Pt called stating that she tested positive for covid this morning    Pt c/o scratchy throat, plugged ears, and a little cough that started today    Pt thought it was allergies and took allergy medication but no relief    Pt took Advil after dinner yesterday for maybe a fever but she said she feels otherwise ok no other symptoms    Pt is wondering how she should handle testing positive and wondering if she should just drink liquids     Please triage

## 2023-08-24 NOTE — TELEPHONE ENCOUNTER
Spoke to pt, endorsing cough, nasal congestion since yesterday. Tested + for COVID this AM. Denies any CP/SOB. Advised of below recs. Voiced understanding. Will call back if needed. Our office uses the recommendations from the CDC. gov web site. Isolate at home for 5 days. The day you test positive is day zero. After the 5 days of isolation, if your symptoms have improved or completely resolved, you can return to the community by wearing a mask for at least 5 additional days. If your symptoms remain mild, over-the-counter treatments such as Tylenol or Motrin, Robitussin for the cough, and Mucinex if the phlegm is difficult to clear is usually effective. Drink lots of water, especially if you do start taking Mucinex. Rest as much as your body requires. If your symptoms worsen to include difficulty breathing/shortness of breath, chest pain, dry hacking cough that over-the-counter cough medication is not helping with, we then recommend going into an Urgent Care/Immediate Care or Emergency Room for prompt evaluation. There are two treatments for Covid that can both be ordered by a physician at an Urgent Care or Emergency Room. The first is called monoclonal antibody treatment and can only be given through an IV line within the first 7 days of symptoms starting. The second treatment is an oral medication called Paxlovid that must be started within the first 5 days of symptoms starting and a prescriber needs to evaluate your personal health conditions and current medications prior to prescribing this medication.

## 2023-10-25 ENCOUNTER — OFFICE VISIT (OUTPATIENT)
Dept: OBGYN CLINIC | Facility: CLINIC | Age: 33
End: 2023-10-25

## 2023-10-25 VITALS
HEIGHT: 62 IN | WEIGHT: 260 LBS | RESPIRATION RATE: 16 BRPM | BODY MASS INDEX: 47.84 KG/M2 | SYSTOLIC BLOOD PRESSURE: 108 MMHG | HEART RATE: 96 BPM | DIASTOLIC BLOOD PRESSURE: 80 MMHG

## 2023-10-25 DIAGNOSIS — Z01.419 ENCOUNTER FOR WELL WOMAN EXAM WITH ROUTINE GYNECOLOGICAL EXAM: Primary | ICD-10-CM

## 2023-10-25 DIAGNOSIS — Z30.09 ENCOUNTER FOR COUNSELING REGARDING CONTRACEPTION: ICD-10-CM

## 2023-10-25 PROCEDURE — 3008F BODY MASS INDEX DOCD: CPT | Performed by: OBSTETRICS & GYNECOLOGY

## 2023-10-25 PROCEDURE — 99395 PREV VISIT EST AGE 18-39: CPT | Performed by: OBSTETRICS & GYNECOLOGY

## 2023-10-25 PROCEDURE — 3079F DIAST BP 80-89 MM HG: CPT | Performed by: OBSTETRICS & GYNECOLOGY

## 2023-10-25 PROCEDURE — 3074F SYST BP LT 130 MM HG: CPT | Performed by: OBSTETRICS & GYNECOLOGY

## 2023-10-25 RX ORDER — DROSPIRENONE, ETHINYL ESTRADIOL AND LEVOMEFOLATE CALCIUM AND LEVOMEFOLATE CALCIUM 3-0.02(24)
KIT ORAL
Qty: 112 TABLET | Refills: 3 | Status: SHIPPED | OUTPATIENT
Start: 2023-10-25

## 2023-10-25 RX ORDER — MELATONIN
1000 DAILY
COMMUNITY

## 2023-12-29 NOTE — TELEPHONE ENCOUNTER
[facility]  MARGOTH Golden C  [address], [address]  [place] [address]  P: [phone]    F: [phone]       Patient:  Rosamaria Sherman  :  1987  MRN#:  [id number]  2023        HPI:    Rosamaria Sherman is a 36 y.o. female who presents today to establish care with new provide, new patient.     GYN ([facility], Jesusita Paul):  S/p colpo, 2023, LSIL, HPV positive    Eye exam:  Awhile, feels like needs a follow up    Dentist:  Has tried OTC mouth guards and needs to get a mouthguard made    Health Maintenance Due   Topic Date Due   • COVID-19 Vaccine (1) Never done         Current Outpatient Medications on File Prior to Visit   Medication Sig Dispense Refill   • norethindrone (ORTHO MICRONOR) 0.35 mg tablet Take 1 tablet (0.35 mg total) by mouth daily. 84 tablet 3     No current facility-administered medications on file prior to visit.         No Known Allergies     Past Medical History:   Diagnosis Date   • Anxiety     used to see a therapist, previously on lexapro   • BPPV (benign paroxysmal positional vertigo)    • H/O human papillomavirus infection    • Ovarian cyst          Past Surgical History:   Procedure Laterality Date   •  SECTION  2021   •  SECTION     • TONSILLECTOMY & ADENOIDECTOMY           Family History   Problem Relation Age of Onset   • No Known Problems Biological Mother    • Liver cancer Biological Father    • Cirrhosis Biological Father    • Coronary artery disease Biological Father    • Heart failure Biological Father    • Stroke Biological Father    • Diabetes Biological Father    • No Known Problems Biological Sister    • No Known Problems Biological Sister    • No Known Problems Biological Brother    • No Known Problems Biological Brother    • Lung cancer Maternal Grandmother         smoker   • Coronary artery disease Maternal Grandfather    • Cancer Paternal Grandmother    •  Pt cancelled appointment today and rescheduled with Dr Jasmin Alonso on 11/7. Mother Leatha Briggs would like to pick-up Rx for pt today. Ovarian cancer Father's Sister    • Liver cancer Father's Brother    • Coronary artery disease Father's Brother    • Prostate cancer Father's Brother    • Breast cancer Cousin         Paternal cousin   • Colon cancer Neg Hx          Social History     Socioeconomic History   • Marital status:      Spouse name: Not on file   • Number of children: Not on file   • Years of education: Not on file   • Highest education level: Not on file   Occupational History   • Not on file   Tobacco Use   • Smoking status: Never   • Smokeless tobacco: Never   Substance and Sexual Activity   • Alcohol use: Yes     Comment: 1 drink/wk. ; special occ.   • Drug use: Never   • Sexual activity: Not on file   Other Topics Concern   • Not on file   Social History Narrative        Children: 2 boys, 2y/o and 4y/o    Occupation: stay at home mom, used to be a teacher    Caffeine: coffee, 2 cups/d     Social Determinants of Health     Financial Resource Strain: Not on file   Food Insecurity: No Food Insecurity (12/3/2022)    Hunger Vital Sign    • Worried About Running Out of Food in the Last Year: Never true    • Ran Out of Food in the Last Year: Never true   Transportation Needs: Not on file   Physical Activity: Not on file   Stress: Not on file   Social Connections: Not on file   Intimate Partner Violence: Not on file   Housing Stability: Not on file         Review of Systems   Constitutional: Negative for appetite change, chills, diaphoresis, fatigue, fever and unexpected weight change.   HENT: Negative for congestion, ear pain, hearing loss, nosebleeds, postnasal drip, sore throat and trouble swallowing.    Eyes: Positive for visual disturbance. Negative for pain.   Respiratory: Negative for apnea, cough, shortness of breath and wheezing.    Cardiovascular: Negative for chest pain, palpitations and leg swelling.   Gastrointestinal: Negative for abdominal pain, blood in stool, constipation, diarrhea, nausea and vomiting.         "GERD: no  -when ovulating or menstruating feeling the pelvic pain, not sure what side, has a cyst   Genitourinary: Negative for dysuria, frequency and hematuria.   Musculoskeletal: Positive for arthralgias and myalgias. Negative for back pain, gait problem, joint swelling and neck pain.        -achy/sore joints, more in the rainy season, no swelling, pts.  said in the fall she was fatigued and myalgias but not as bad now.    Skin: Negative for rash.   Allergic/Immunologic: Negative for environmental allergies and food allergies.   Neurological: Positive for headaches. Negative for dizziness, tremors, syncope, weakness, light-headedness and numbness.        -pt. Has been getting headaches in the sides of the head, is a clincher/   Hematological: Negative for adenopathy. Does not bruise/bleed easily.   Psychiatric/Behavioral: Negative for decreased concentration, dysphoric mood, sleep disturbance and suicidal ideas. The patient is not nervous/anxious.         -gets situational anxiety pankaj. With new places, has been coping with it well lately           Vitals:    12/29/23 1052   BP: 108/70   BP Location: Left upper arm   Patient Position: Sitting   Pulse: (!) 104   Resp: 16   Temp: 36.6 °C (97.8 °F)   TempSrc: Temporal   SpO2: 98%   Weight: 58.1 kg (128 lb)   Height: 1.588 m (5' 2.5\")     Body mass index is 23.04 kg/m².    Physical Exam  Vitals reviewed.   Constitutional:       General: She is not in acute distress.     Appearance: Normal appearance. She is normal weight. She is not ill-appearing, toxic-appearing or diaphoretic.   HENT:      Right Ear: Hearing, tympanic membrane, ear canal and external ear normal. No decreased hearing noted. Tympanic membrane is not erythematous or bulging.      Left Ear: Hearing, tympanic membrane, ear canal and external ear normal. No decreased hearing noted. Tympanic membrane is not erythematous or bulging.      Nose:      Right Turbinates: Not enlarged or swollen.     "  Left Turbinates: Not enlarged or swollen.      Mouth/Throat:      Lips: Pink. No lesions.      Pharynx: Oropharynx is clear. Uvula midline. No pharyngeal swelling, oropharyngeal exudate, posterior oropharyngeal erythema or uvula swelling.      Tonsils: No tonsillar exudate. 1+ on the right. 1+ on the left.   Eyes:      Extraocular Movements: Extraocular movements intact.      Right eye: No nystagmus.      Left eye: No nystagmus.      Conjunctiva/sclera: Conjunctivae normal.      Pupils: Pupils are equal, round, and reactive to light.   Neck:      Thyroid: No thyroid mass or thyromegaly.   Cardiovascular:      Rate and Rhythm: Normal rate and regular rhythm.      Pulses:           Carotid pulses are 2+ on the right side and 2+ on the left side.       Radial pulses are 2+ on the right side and 2+ on the left side.        Posterior tibial pulses are 2+ on the right side and 2+ on the left side.      Heart sounds: Normal heart sounds, S1 normal and S2 normal. No murmur heard.     Comments: No carotid bruits  Pulmonary:      Effort: Pulmonary effort is normal.      Breath sounds: Normal breath sounds. No decreased breath sounds, wheezing, rhonchi or rales.   Chest:      Comments: Deferred to GYN  Abdominal:      General: Bowel sounds are normal. There is no distension.      Palpations: Abdomen is soft. There is no hepatomegaly, splenomegaly or mass.      Tenderness: There is no abdominal tenderness. There is no guarding or rebound.      Hernia: No hernia is present.   Genitourinary:     Comments: Deferred to GYN  Musculoskeletal:      Right lower leg: No edema.      Left lower leg: No edema.      Comments: FAROM bilat. UE/LE.  5/5 RAMO bilat. UE/LE.    Lymphadenopathy:      Cervical: No cervical adenopathy.   Skin:     General: Skin is warm and dry.      Findings: No rash.   Neurological:      Mental Status: She is alert and oriented to person, place, and time.      Cranial Nerves: No cranial nerve deficit.      Sensory:  Sensation is intact. No sensory deficit.      Motor: Motor function is intact.      Coordination: Coordination is intact.      Gait: Gait is intact. Gait normal.      Deep Tendon Reflexes: Reflexes are normal and symmetric.   Psychiatric:         Mood and Affect: Mood normal.         Behavior: Behavior normal.         Thought Content: Thought content normal.         Judgment: Judgment normal.            Lab Results   Component Value Date    WBC 8.83 12/03/2022    HGB 10.0 (L) 12/03/2022    HCT 29.9 (L) 12/03/2022     12/03/2022    ALT 32 12/03/2022    AST 33 12/03/2022     (L) 12/03/2022    K 3.6 12/03/2022    GLUCOSE NEGATIVE 08/25/2023     12/03/2022    CREATININE 0.6 12/03/2022    BUN 7 (L) 12/03/2022    CO2 24 12/03/2022    EGFR >60.0 12/03/2022         Assessment and Plan:  Diagnoses and all orders for this visit:    Encounter for general adult medical examination with abnormal findings (Primary)  -     CBC and differential; Future  -     Comprehensive metabolic panel; Future  -     Lipid panel; Future  -     TSH w reflex FT4; Future  -     Hemoglobin A1c; Future  -     Ambulatory referral to Dermatology; Future    Other fatigue  -     Lyme Abs Total W/Reflex WB; Future  - Pt. Has occ. Fatigue and myalgias.   thought in the Fall may have had lyme but never was evaluated and symptoms have improved.  Check lyme titer with labs ordered today.  ? More reactive to viral illnesses with 2 young children at home.  No joint swelling/redness/persisting symptoms.  Pt. Aware to monitor for this and be evaluated if occurs.    Myalgia  -     Lyme Abs Total W/Reflex WB; Future    Vision changes  -     Ambulatory referral to Ophthalmology; Future  - Pt. Asking for eye doctor recommendation, change in vision, ? Needs glasses.    Screening for diabetes mellitus  -     Hemoglobin A1c; Future    Screening for hyperlipidemia  -     Lipid panel; Future    Need for immunization against influenza  -      Influenza vaccine quadrivalent preservative free 6 mon and older IM (FluLaval)      - Pt. Advised to f/u with GYN for routine PAP as her last was abnormal, per GYN notes due in 1/2024.       -Patient acknowledged understanding and agreement with the above treatment plan as well as reasons for seeking more immediate follow-up care.   -Pt. Advised we have a Psychologist Dr. Bermudez in our office to see if needed for social anxiety in the future or now.         Orders Placed This Encounter   Procedures   • Influenza vaccine quadrivalent preservative free 6 mon and older IM (FluLaval)   • Lyme Abs Total W/Reflex WB   • CBC and differential   • Comprehensive metabolic panel   • Lipid panel   • TSH w reflex FT4   • Hemoglobin A1c   • Ambulatory referral to Ophthalmology   • Ambulatory referral to Dermatology                  In 1 year MARGOTH Golden [facility]              MARGOTH Golden

## 2024-09-16 ENCOUNTER — OFFICE VISIT (OUTPATIENT)
Dept: FAMILY MEDICINE CLINIC | Facility: CLINIC | Age: 34
End: 2024-09-16
Payer: COMMERCIAL

## 2024-09-16 VITALS
DIASTOLIC BLOOD PRESSURE: 82 MMHG | HEART RATE: 94 BPM | TEMPERATURE: 98 F | OXYGEN SATURATION: 98 % | RESPIRATION RATE: 18 BRPM | SYSTOLIC BLOOD PRESSURE: 128 MMHG | BODY MASS INDEX: 48 KG/M2 | HEIGHT: 62 IN

## 2024-09-16 DIAGNOSIS — B97.89 ACUTE VIRAL SINUSITIS: Primary | ICD-10-CM

## 2024-09-16 DIAGNOSIS — J34.89 SINUS PRESSURE: ICD-10-CM

## 2024-09-16 DIAGNOSIS — J01.90 ACUTE VIRAL SINUSITIS: Primary | ICD-10-CM

## 2024-09-16 DIAGNOSIS — R51.9 SINUS HEADACHE: ICD-10-CM

## 2024-09-16 LAB
OPERATOR ID: NORMAL
RAPID SARS-COV-2 BY PCR: NOT DETECTED

## 2024-09-16 NOTE — PROGRESS NOTES
CHIEF COMPLAINT:     Chief Complaint   Patient presents with    Sinus Problem     Sinus pressure, ear pain, 2 days. No fever, suspected tactile. +PND, scratchy throat. DayQuil.        HPI:   Kamille Bass is a 34 year old female who presents for upper respiratory symptoms for  2 days.  Patient reports sore throat, congestion, ear pain, sinus pain, prior history of sinusitis, denies fever. Symptoms have been persisting since onset.  Treating symptoms with DayQuil.  Denies SOB, loss of taste or smell. Denies cough. Denies n/v/d.    Current Outpatient Medications   Medication Sig Dispense Refill    cyanocobalamin 1000 MCG Oral Tab Take 1 tablet (1,000 mcg total) by mouth daily.      Drospiren-Eth Estrad-Levomefol (BEYAZ) 3-0.02-0.451 MG Oral Tab 1 active tablet PO daily. Skip blank/placebo pills for active continuous therapy. 112 tablet 3    MONTELUKAST 10 MG Oral Tab TAKE ONE TABLET BY MOUTH ONE TIME DAILY 90 tablet 0    cholecalciferol (VITAMIN D3) 125 MCG (5000 UT) Oral Cap Take 1 capsule (5,000 Units total) by mouth daily.        Past Medical History:    Allergic rhinitis    Chronic bilateral low back pain without sciatica    had been from a pulled muscle. Had done PT. Issues come & go     Fatty liver    CT a/p 12/31/19 - Stable hepatomegaly and fatty infiltration of the liver.      Gastroesophageal reflux disease without esophagitis    Hx of concussion    around 11 years old    Kidney stones    last episode 2013     Morbid obesity with BMI of 45.0-49.9, adult (HCC)    Phentermine caused tachycardia     Pap smear for cervical cancer screening    Pap & HPV negative.     Spinal stenosis    says she has narrowing of her spinal column & some mild disc herniation in cervical spine.     Vitamin B12 deficiency    Vitamin D deficiency      Past Surgical History:   Procedure Laterality Date    Laparoscopic cholecystectomy  6/28/2013    Procedure: LAPAROSCOPIC CHOLECYSTECTOMY;  Surgeon: Mayra Valdez MD;  Location: [facility]  OR    Lithotripsy      Tonsillectomy  2002    Richardson teeth removed  2008         Social History     Socioeconomic History    Marital status: Single   Tobacco Use    Smoking status: Never    Smokeless tobacco: Never   Vaping Use    Vaping status: Never Used   Substance and Sexual Activity    Alcohol use: Yes     Comment: Social - at a party - occasional     Drug use: No    Sexual activity: Never     Birth control/protection: OCP   Other Topics Concern    Caffeine Concern Yes     Comment: rare use    Stress Concern No    Weight Concern Yes    Special Diet No    Exercise No    Seat Belt Yes         REVIEW OF SYSTEMS:   GENERAL: no change in appetite  SKIN: no rashes or abnormal skin lesions  HEENT: See HPI  LUNGS: See HPI  CARDIOVASCULAR: denies chest pain or palpitations   GI: denies N/V/C or abdominal pain      EXAM:   /82   Pulse 94   Temp 97.6 °F (36.4 °C)   Resp 18   Ht 5' 2\" (1.575 m)   LMP 10/20/2023 (Exact Date)   SpO2 98%   BMI 47.55 kg/m²   GENERAL: well developed, well nourished,in no apparent distress  SKIN: no rashes,no suspicious lesions  HEAD: atraumatic, normocephalic.    EYES: conjunctiva clear, EOM intact  EARS: TM's bilaterally non-erythematous, no bulging, no retraction, no fluid, bony landmarks clearly visualized  NOSE: Nostrils patent, no nasal discharge, nasal mucosa pink and non-inflamed   THROAT: Oral mucosa pink, moist. Posterior pharynx is not erythematous. No exudates. Tonsils 2/4.    NECK: Supple, non-tender  LUNGS: clear to auscultation bilaterally, no wheezes or rhonchi. Breathing is non labored.  CARDIO: RRR without murmur  EXTREMITIES: no cyanosis, clubbing or edema  LYMPH:  No lymphadenopathy.        ASSESSMENT AND PLAN:   Kamille Bass is a 34 year old female who presents with upper respiratory symptoms that are consistent with    ASSESSMENT:     Encounter Diagnoses   Name Primary?    Sinus pressure     Acute viral sinusitis Yes    Sinus headache        Orders Placed This  Encounter   Procedures    Rapid Covid-19   NEGATIVE    Meds & Refills for this Visit:  Requested Prescriptions      No prescriptions requested or ordered in this encounter       Imaging & Consults:  None    PLAN: Comfort care as described in Patient Instructions.     Take Ibuprofen 600 mg every 8 hours as needed for pain.  Normal saline nasal sprays throughout the day.  Afrin as needed for max of 3 days.  Humidified air/steamy showers.     The patient indicates understanding of these issues and agrees to the plan.  The patient is asked to f/u with PCP if sx's persist or worsen.

## 2024-11-13 ENCOUNTER — OFFICE VISIT (OUTPATIENT)
Dept: OBGYN CLINIC | Facility: CLINIC | Age: 34
End: 2024-11-13
Payer: COMMERCIAL

## 2024-11-13 VITALS
HEART RATE: 100 BPM | DIASTOLIC BLOOD PRESSURE: 70 MMHG | HEIGHT: 62 IN | BODY MASS INDEX: 49.87 KG/M2 | SYSTOLIC BLOOD PRESSURE: 118 MMHG | WEIGHT: 271 LBS

## 2024-11-13 DIAGNOSIS — Z30.09 ENCOUNTER FOR COUNSELING REGARDING CONTRACEPTION: ICD-10-CM

## 2024-11-13 PROCEDURE — 3078F DIAST BP <80 MM HG: CPT | Performed by: OBSTETRICS & GYNECOLOGY

## 2024-11-13 PROCEDURE — 3074F SYST BP LT 130 MM HG: CPT | Performed by: OBSTETRICS & GYNECOLOGY

## 2024-11-13 PROCEDURE — 99395 PREV VISIT EST AGE 18-39: CPT | Performed by: OBSTETRICS & GYNECOLOGY

## 2024-11-13 PROCEDURE — 3008F BODY MASS INDEX DOCD: CPT | Performed by: OBSTETRICS & GYNECOLOGY

## 2024-11-13 PROCEDURE — 99459 PELVIC EXAMINATION: CPT | Performed by: OBSTETRICS & GYNECOLOGY

## 2024-11-13 RX ORDER — DROSPIRENONE, ETHINYL ESTRADIOL AND LEVOMEFOLATE CALCIUM AND LEVOMEFOLATE CALCIUM 3-0.02(24)
1 KIT ORAL DAILY
Qty: 84 TABLET | Refills: 4 | Status: SHIPPED | OUTPATIENT
Start: 2024-11-13

## 2024-11-13 NOTE — PROGRESS NOTES
[facility]  Obstetrics and Gynecology    Subjective:     Kamille Bass is a 34 year old  who presents for an annual gyn exam. Patient complaints include mild cramping around period time, but barely any bleeding. Might switch back to usual schedule of OCP instead of continuous.    No LMP recorded. (Menstrual status: Continuous Pill).   Menarche: 11 (2024 12:59 PM)  Period Cycle (Days): continuous pill - no cycle (2024 12:59 PM)  Period Duration (Days): 1 (2024 12:59 PM)  Period Flow: spotting (2024 12:59 PM)  Use of Birth Control (if yes, specify type): OCP (2024 12:59 PM)  Date When Birth Control Last Used: current (2024 12:59 PM)  Hx Prior Abnormal Pap: No (2024 12:59 PM)  Pap Date: 10/10/22 (2024 12:59 PM)  Pap Result Notes: wnl (2024 12:59 PM)     Dyspareunia: No.    Difficulty with bowel or bladder function: No.   History of STDs: No.  Smoker: No.  Safe at home: Yes.   for high school.     Screening:  Pap smear: neg   Mammogram: n/a -    Colonoscopy: n/a - No recommendations at this time   DEXA: n/a No recommendations at this time           Review of Systems  Constitutional: Denies fever/chills, weight loss, fatigue, weakness, or sweating  HEENT: Denies headache, hearing loss or tinnitus, ear pain or discharge, nosebleeds, congestion, sore throat  Eye: Denies visual changes, eye pain or discharge or redness  Cardiovascular: Denies chest pain, palpitations  Pulmonary: Denies cough, shortness of breath, wheezing  Breast: denies breast pain or palpable mass, skin changes, nipple discharge  GI: Denies nausea, vomiting, diarrhea, constipation, heartburn, hemachezia  : Denies dysuria, urgency, frequency, hematuria, flank pain  Musculoskeletal: Denies myalgias, pain in neck or back or joints  Skin: Denies rash, itching  Endocrine: Denies easy bruising or bleeding, increased thirst  Neuro: Denies dizziness, paraesthesias, focal  weakness, seizures, loss of consciousness  Psych: Denies depression, anxiety, suicidal ideations, hallucinations, insomnia     Past Medical History   Past Medical History:    Allergic rhinitis    Chronic bilateral low back pain without sciatica    had been from a pulled muscle. Had done PT. Issues come & go     Fatty liver    CT a/p 19 - Stable hepatomegaly and fatty infiltration of the liver.      Gastroesophageal reflux disease without esophagitis    Hx of concussion    around 11 years old    Kidney stones    last episode      Morbid obesity with BMI of 45.0-49.9, adult (HCC)    Phentermine caused tachycardia     Pap smear for cervical cancer screening    Pap & HPV negative.     Spinal stenosis    says she has narrowing of her spinal column & some mild disc herniation in cervical spine.     Vitamin B12 deficiency    Vitamin D deficiency         Past Obstetric History   OB History    Para Term  AB Living   0 0 0 0 0 0   SAB IAB Ectopic Multiple Live Births   0 0 0 0 0       Past Surgical History   Past Surgical History:   Procedure Laterality Date    Laparoscopic cholecystectomy  2013    Procedure: LAPAROSCOPIC CHOLECYSTECTOMY;  Surgeon: Mayra Valdez MD;  Location: [facility] MAIN OR    Lithotripsy      Tonsillectomy  2002    [place] teeth removed          Family History   Family History   Problem Relation Age of Onset    Hypertension Father 55    Diabetes Father 60    Other (Arthritis) Mother     Other (stress urinary incontinence) Mother     Other (dysmenorrhea) Sister     Diabetes Maternal Grandmother         unknown    Heart Disease Maternal Grandmother         unknown    Heart Disease Maternal Grandfather         unknown    Heart Disorder Paternal Grandmother     Heart Disease Paternal Grandmother     Other (Other) Paternal Grandmother     Diabetes Paternal Grandfather     Heart Disease Paternal Grandfather     Breast Cancer Neg     Ovarian Cancer Neg     Colon Cancer Neg      Infertility Neg     Endometriosis Neg     Thyroid Cancer Neg         Social History   Social History     Socioeconomic History    Marital status: Single   Tobacco Use    Smoking status: Never    Smokeless tobacco: Never   Vaping Use    Vaping status: Never Used   Substance and Sexual Activity    Alcohol use: Yes     Comment: Social - at a party - occasional     Drug use: No    Sexual activity: Never     Birth control/protection: OCP   Other Topics Concern    Caffeine Concern Yes     Comment: rare use    Stress Concern No    Weight Concern Yes    Special Diet No    Exercise No    Seat Belt Yes        Medications   Medications Ordered Prior to Encounter[1]    Allergies   Allergies[2]       Objective:     Vitals:    24 1258   BP: 118/70   Pulse: 100   Weight: 271 lb (122.9 kg)   Height: 62\"       Body mass index is 49.57 kg/m².    GEN: AAOx3, NAD, appears well, appears stated age  HEENT: normocephalic, atraumatic, thyroid symmetric without enlargement or nodularity  RESP: breathing comfortably on room air  BREAST: bilaterally symmetric with no palpable masses, no nipple discharge, no skin changes  ABD: soft, NT, ND, no rebound or guarding  EXT: no c/c/e or tenderness  NEURO: CN 2-12 grossly intact  SKIN: no lesions of note  PELVIC:   Vulva: NEFG.   Vagina: declined SSE   Cervix: declined SSE, no palpated abnormalities   Uterus: firm, mobile, nontender.     Adnexa: No masses or tenderness.     Rectal: Anus and perineum are normal.    Chaperone offered and declined. Exam performed by NP student.    Assessment:     Kamille Bass is a 34 year old  seen for well-women gyn exam.    Plan:     -- cervical cancer screening: up to date with pap smear.   -- breast cancer screening: continue annual CBE, start annual mammograms at 39yo  -- STD screening ordered: No  -- Contraception: continue OCP  -- Discussed further preventative care with PCP, staying up to date with screening and vaccinations, and maintaining healthy  diet and exercise.      -- Follow up in 1 year for annual exam or sooner as needed    Laura Espinosa MD  [facility]  11/13/2024 1:38 PM           [1]   Current Outpatient Medications on File Prior to Visit   Medication Sig Dispense Refill    cyanocobalamin 1000 MCG Oral Tab Take 1 tablet (1,000 mcg total) by mouth daily.      Drospiren-Eth Estrad-Levomefol (BEYAZ) 3-0.02-0.451 MG Oral Tab 1 active tablet PO daily. Skip blank/placebo pills for active continuous therapy. 112 tablet 3    MONTELUKAST 10 MG Oral Tab TAKE ONE TABLET BY MOUTH ONE TIME DAILY 90 tablet 0    cholecalciferol (VITAMIN D3) 125 MCG (5000 UT) Oral Cap Take 1 capsule (5,000 Units total) by mouth daily.       Current Facility-Administered Medications on File Prior to Visit   Medication Dose Route Frequency Provider Last Rate Last Admin    cyanocobalamin (VITAMIN B12) 1000 MCG/ML injection 1,000 mcg  1,000 mcg Intramuscular Once Miriam Martinez DO       [2]   Allergies  Allergen Reactions    Latex RASH     RASH FROM LATEX GLOVES. LIPS TINGLE WHEN BLOWING UP BALLOONS.    Topiramate OTHER (SEE COMMENTS)     Numbness and tingling

## 2024-12-21 ENCOUNTER — OFFICE VISIT (OUTPATIENT)
Dept: FAMILY MEDICINE CLINIC | Facility: CLINIC | Age: 34
End: 2024-12-21
Payer: COMMERCIAL

## 2024-12-21 VITALS
TEMPERATURE: 99 F | RESPIRATION RATE: 18 BRPM | DIASTOLIC BLOOD PRESSURE: 74 MMHG | OXYGEN SATURATION: 98 % | BODY MASS INDEX: 50 KG/M2 | HEART RATE: 109 BPM | SYSTOLIC BLOOD PRESSURE: 124 MMHG | WEIGHT: 275 LBS

## 2024-12-21 DIAGNOSIS — L03.032 PARONYCHIA OF GREAT TOE OF LEFT FOOT: Primary | ICD-10-CM

## 2024-12-21 RX ORDER — CEFADROXIL 500 MG/1
500 CAPSULE ORAL 2 TIMES DAILY
Qty: 14 CAPSULE | Refills: 0 | Status: SHIPPED | OUTPATIENT
Start: 2024-12-21 | End: 2024-12-28

## 2024-12-21 NOTE — PROGRESS NOTES
Subjective:   Patient ID: Kamille Bass is a 34 year old female.    Patient presents for evaluation of left great toenail infection. Notes clips nails short, has some redness and swelling to left great toenail. Has tried H2O2 and neosporin with mild relief. Denies fever.        History/Other:   Review of Systems   Skin:         As above in HPI   All other systems reviewed and are negative.    Current Outpatient Medications   Medication Sig Dispense Refill    Drospiren-Eth Estrad-Levomefol (BEYAZ) 3-0.02-0.451 MG Oral Tab Take 1 tablet by mouth daily. 84 tablet 4    cyanocobalamin 1000 MCG Oral Tab Take 1 tablet (1,000 mcg total) by mouth daily.      MONTELUKAST 10 MG Oral Tab TAKE ONE TABLET BY MOUTH ONE TIME DAILY 90 tablet 0    cholecalciferol (VITAMIN D3) 125 MCG (5000 UT) Oral Cap Take 1 capsule (5,000 Units total) by mouth daily.       Allergies:Allergies[1]  /74   Pulse 109   Temp 98.6 °F (37 °C) (Oral)   Resp 18   Wt 275 lb (124.7 kg)   SpO2 98%   BMI 50.30 kg/m²       Objective:   Physical Exam  Constitutional:       General: She is not in acute distress.     Appearance: Normal appearance. She is not ill-appearing.   HENT:      Head: Normocephalic and atraumatic.   Cardiovascular:      Rate and Rhythm: Normal rate.   Pulmonary:      Effort: Pulmonary effort is normal.   Musculoskeletal:         General: Normal range of motion.      Cervical back: Normal range of motion.        Feet:    Feet:      Comments: Left medial cuticle edge of left great toe reddened, mild swelling, no current discharge. Mildly tender to touch, non fluctuant.  Skin:     General: Skin is warm and dry.   Neurological:      General: No focal deficit present.      Mental Status: She is alert.   Psychiatric:         Mood and Affect: Mood normal.         Behavior: Behavior normal.         Assessment & Plan:   1. Paronychia of great toe of left foot        No orders of the defined types were placed in this encounter.      Meds This  Visit:  Requested Prescriptions     Signed Prescriptions Disp Refills    cefadroxil 500 MG Oral Cap 14 capsule 0     Sig: Take 1 capsule (500 mg total) by mouth 2 (two) times daily for 7 days.     Medication use and risk/benefit discussed. Skin care discussed. Follow up with PCP as needed. Patient verbalized understanding and agrees to plan.          [1]   Allergies  Allergen Reactions    Latex RASH     RASH FROM LATEX GLOVES. LIPS TINGLE WHEN BLOWING UP BALLOONS.    Topiramate OTHER (SEE COMMENTS)     Numbness and tingling

## 2025-02-08 ENCOUNTER — PATIENT MESSAGE (OUTPATIENT)
Dept: INTERNAL MEDICINE CLINIC | Facility: CLINIC | Age: 35
End: 2025-02-08

## 2025-02-08 ENCOUNTER — OFFICE VISIT (OUTPATIENT)
Dept: INTERNAL MEDICINE CLINIC | Facility: CLINIC | Age: 35
End: 2025-02-08
Payer: COMMERCIAL

## 2025-02-08 ENCOUNTER — LAB ENCOUNTER (OUTPATIENT)
Dept: LAB | Age: 35
End: 2025-02-08
Attending: INTERNAL MEDICINE
Payer: COMMERCIAL

## 2025-02-08 VITALS
OXYGEN SATURATION: 98 % | DIASTOLIC BLOOD PRESSURE: 70 MMHG | WEIGHT: 270 LBS | BODY MASS INDEX: 49.69 KG/M2 | SYSTOLIC BLOOD PRESSURE: 120 MMHG | TEMPERATURE: 98 F | HEIGHT: 62 IN | HEART RATE: 82 BPM

## 2025-02-08 DIAGNOSIS — E53.8 VITAMIN B12 DEFICIENCY: ICD-10-CM

## 2025-02-08 DIAGNOSIS — Z00.00 ROUTINE PHYSICAL EXAMINATION: ICD-10-CM

## 2025-02-08 DIAGNOSIS — E66.01 MORBID OBESITY WITH BMI OF 45.0-49.9, ADULT (HCC): ICD-10-CM

## 2025-02-08 DIAGNOSIS — E55.9 VITAMIN D DEFICIENCY: ICD-10-CM

## 2025-02-08 DIAGNOSIS — K21.9 GASTROESOPHAGEAL REFLUX DISEASE WITHOUT ESOPHAGITIS: ICD-10-CM

## 2025-02-08 DIAGNOSIS — J30.89 ALLERGIC RHINITIS DUE TO OTHER ALLERGIC TRIGGER, UNSPECIFIED SEASONALITY: ICD-10-CM

## 2025-02-08 DIAGNOSIS — Z00.00 ROUTINE PHYSICAL EXAMINATION: Primary | ICD-10-CM

## 2025-02-08 PROBLEM — G89.29 CHRONIC BILATERAL LOW BACK PAIN WITHOUT SCIATICA: Status: RESOLVED | Noted: 2021-08-07 | Resolved: 2025-02-08

## 2025-02-08 PROBLEM — Z87.442 HISTORY OF KIDNEY STONES: Status: ACTIVE | Noted: 2025-02-08

## 2025-02-08 PROBLEM — M54.50 CHRONIC BILATERAL LOW BACK PAIN WITHOUT SCIATICA: Status: RESOLVED | Noted: 2021-08-07 | Resolved: 2025-02-08

## 2025-02-08 PROBLEM — Z90.49 S/P CHOLECYSTECTOMY: Status: ACTIVE | Noted: 2025-02-08

## 2025-02-08 LAB
ALT SERPL-CCNC: 16 U/L
ANION GAP SERPL CALC-SCNC: 10 MMOL/L (ref 0–18)
AST SERPL-CCNC: 25 U/L (ref ?–34)
BASOPHILS # BLD AUTO: 0.03 X10(3) UL (ref 0–0.2)
BASOPHILS NFR BLD AUTO: 0.5 %
BUN BLD-MCNC: 13 MG/DL (ref 9–23)
CALCIUM BLD-MCNC: 9.1 MG/DL (ref 8.7–10.6)
CHLORIDE SERPL-SCNC: 103 MMOL/L (ref 98–112)
CHOLEST SERPL-MCNC: 198 MG/DL (ref ?–200)
CO2 SERPL-SCNC: 26 MMOL/L (ref 21–32)
CREAT BLD-MCNC: 0.69 MG/DL
EGFRCR SERPLBLD CKD-EPI 2021: 117 ML/MIN/1.73M2 (ref 60–?)
EOSINOPHIL # BLD AUTO: 0.06 X10(3) UL (ref 0–0.7)
EOSINOPHIL NFR BLD AUTO: 1 %
ERYTHROCYTE [DISTWIDTH] IN BLOOD BY AUTOMATED COUNT: 12.9 %
EST. AVERAGE GLUCOSE BLD GHB EST-MCNC: 108 MG/DL (ref 68–126)
FASTING PATIENT LIPID ANSWER: YES
FASTING STATUS PATIENT QL REPORTED: YES
GLUCOSE BLD-MCNC: 92 MG/DL (ref 70–99)
HBA1C MFR BLD: 5.4 % (ref ?–5.7)
HCT VFR BLD AUTO: 42.5 %
HDLC SERPL-MCNC: 50 MG/DL (ref 40–59)
HGB BLD-MCNC: 13.4 G/DL
IMM GRANULOCYTES # BLD AUTO: 0.04 X10(3) UL (ref 0–1)
IMM GRANULOCYTES NFR BLD: 0.7 %
LDLC SERPL CALC-MCNC: 90 MG/DL (ref ?–100)
LYMPHOCYTES # BLD AUTO: 1.8 X10(3) UL (ref 1–4)
LYMPHOCYTES NFR BLD AUTO: 31.3 %
MCH RBC QN AUTO: 29.8 PG (ref 26–34)
MCHC RBC AUTO-ENTMCNC: 31.5 G/DL (ref 31–37)
MCV RBC AUTO: 94.4 FL
MONOCYTES # BLD AUTO: 0.51 X10(3) UL (ref 0.1–1)
MONOCYTES NFR BLD AUTO: 8.9 %
NEUTROPHILS # BLD AUTO: 3.32 X10 (3) UL (ref 1.5–7.7)
NEUTROPHILS # BLD AUTO: 3.32 X10(3) UL (ref 1.5–7.7)
NEUTROPHILS NFR BLD AUTO: 57.6 %
NONHDLC SERPL-MCNC: 148 MG/DL (ref ?–130)
OSMOLALITY SERPL CALC.SUM OF ELEC: 288 MOSM/KG (ref 275–295)
PLATELET # BLD AUTO: 297 10(3)UL (ref 150–450)
POTASSIUM SERPL-SCNC: 4.1 MMOL/L (ref 3.5–5.1)
RBC # BLD AUTO: 4.5 X10(6)UL
SODIUM SERPL-SCNC: 139 MMOL/L (ref 136–145)
TRIGL SERPL-MCNC: 354 MG/DL (ref 30–149)
TSI SER-ACNC: 1.63 UIU/ML (ref 0.55–4.78)
VIT B12 SERPL-MCNC: 1529 PG/ML (ref 211–911)
VIT D+METAB SERPL-MCNC: 41.7 NG/ML (ref 30–100)
VLDLC SERPL CALC-MCNC: 58 MG/DL (ref 0–30)
WBC # BLD AUTO: 5.8 X10(3) UL (ref 4–11)

## 2025-02-08 PROCEDURE — 80061 LIPID PANEL: CPT

## 2025-02-08 PROCEDURE — 84450 TRANSFERASE (AST) (SGOT): CPT

## 2025-02-08 PROCEDURE — 82607 VITAMIN B-12: CPT

## 2025-02-08 PROCEDURE — 3074F SYST BP LT 130 MM HG: CPT | Performed by: INTERNAL MEDICINE

## 2025-02-08 PROCEDURE — 99395 PREV VISIT EST AGE 18-39: CPT | Performed by: INTERNAL MEDICINE

## 2025-02-08 PROCEDURE — 36415 COLL VENOUS BLD VENIPUNCTURE: CPT

## 2025-02-08 PROCEDURE — 90471 IMMUNIZATION ADMIN: CPT | Performed by: INTERNAL MEDICINE

## 2025-02-08 PROCEDURE — 80048 BASIC METABOLIC PNL TOTAL CA: CPT

## 2025-02-08 PROCEDURE — 90715 TDAP VACCINE 7 YRS/> IM: CPT | Performed by: INTERNAL MEDICINE

## 2025-02-08 PROCEDURE — 84460 ALANINE AMINO (ALT) (SGPT): CPT

## 2025-02-08 PROCEDURE — 3078F DIAST BP <80 MM HG: CPT | Performed by: INTERNAL MEDICINE

## 2025-02-08 PROCEDURE — 3008F BODY MASS INDEX DOCD: CPT | Performed by: INTERNAL MEDICINE

## 2025-02-08 PROCEDURE — 84443 ASSAY THYROID STIM HORMONE: CPT

## 2025-02-08 PROCEDURE — 82306 VITAMIN D 25 HYDROXY: CPT

## 2025-02-08 PROCEDURE — 85025 COMPLETE CBC W/AUTO DIFF WBC: CPT

## 2025-02-08 PROCEDURE — 83036 HEMOGLOBIN GLYCOSYLATED A1C: CPT

## 2025-02-08 RX ORDER — TIRZEPATIDE 2.5 MG/.5ML
2.5 INJECTION, SOLUTION SUBCUTANEOUS WEEKLY
Qty: 2 ML | Refills: 0 | Status: SHIPPED | OUTPATIENT
Start: 2025-02-08

## 2025-02-08 NOTE — PATIENT INSTRUCTIONS
Continue to exercise at least 150 minutes a week and Eat a plant based diet     Please take 2000 IU of vitamin D daily for life to keep your bones strong    Please see your dentist every 6 months    Continue with regular eye exams    You received the tetanus vaccine today and it may cause soreness of the arm for 24 hours    Please have blood work done and I have sent Zepbound for you.  You will start on the smallest dose weekly for 1 month then we will increase the dose    See me back in 6 weeks for weight check and let me know if the acid reflux is getting worse with it

## 2025-02-08 NOTE — PROGRESS NOTES
Patient Office Visit    ASSESSMENT AND PLAN:   1. Routine physical examination  Note: Continue to exercise at least 150 minutes a week and Eat a plant based diet. Please take 2000 IU of vitamin D daily for life to keep your bones strong. Please see your dentist every 6 months.  Continue with regular eye exams and Tdap vaccine provided today  - ALT(SGPT); Future  - AST (SGOT); Future  - Basic Metabolic Panel (8); Future  - Lipid Panel; Future  - CBC With Differential With Platelet; Future  - Hemoglobin A1C; Future  - Vitamin B12; Future  - Vitamin D; Future  - TSH W Reflex To Free T4; Future    2. Morbid obesity with BMI of 45.0-49.9, adult (HCC)  Note: Had tolerated Wegovy in the past but it caused acid reflux.  Will start Zepbound.  - Tirzepatide-Weight Management (ZEPBOUND) 2.5 MG/0.5ML Subcutaneous Solution Auto-injector; Inject 2.5 mg into the skin once a week.  Dispense: 2 mL; Refill: 0    3. Gastroesophageal reflux disease without esophagitis  Note: Diet controlled    4. Vitamin B12 deficiency  Note: Continue vitamin B12 sublingual  - Vitamin B12; Future    5. Vitamin D deficiency  Note: Continue vitamin D  - Vitamin D; Future    6. Allergic rhinitis due to other allergic trigger, unspecified seasonality  Note: Takes montelukast as needed    Return to clinic in 6 weeks      Patient/Caregiver Education: Patient/Caregiver Education: There are no barriers to learning. Medical education done. Outcome: Patient verbalizes understanding. Patient is notified to call with any questions, complications, allergies, or worsening or changing symptoms.  Patient is to call with any side effects or complications from the treatments as a result of today.      Reviewed Past Medical History and   Patient Active Problem List   Diagnosis    Contraception    Allergic rhinitis    Morbid obesity with BMI of 45.0-49.9, adult (HCC)    Chronic bilateral low back pain without sciatica    Gastroesophageal reflux disease without esophagitis     Vitamin B12 deficiency       Orders Placed This Encounter   Procedures    ALT(SGPT)     Standing Status:   Future     Number of Occurrences:   1     Standing Expiration Date:   2/8/2026    AST (SGOT)     Standing Status:   Future     Number of Occurrences:   1     Standing Expiration Date:   2/8/2026    Basic Metabolic Panel (8)     Standing Status:   Future     Number of Occurrences:   1     Standing Expiration Date:   2/8/2026    Lipid Panel     Standing Status:   Future     Number of Occurrences:   1     Standing Expiration Date:   2/8/2026    CBC With Differential With Platelet     Standing Status:   Future     Number of Occurrences:   1     Standing Expiration Date:   2/8/2026    Hemoglobin A1C     Standing Status:   Future     Number of Occurrences:   1     Standing Expiration Date:   2/8/2026    Vitamin B12     Standing Status:   Future     Number of Occurrences:   1     Standing Expiration Date:   2/8/2026    Vitamin D     Standing Status:   Future     Number of Occurrences:   1     Standing Expiration Date:   2/8/2026     Order Specific Question:   Please pick the scenario that best fits the purpose for ordering this test     Answer:   General Screening/Vit D deficiency (25-Hydroxy)     Order Specific Question:   Release to patient     Answer:   Immediate    TSH W Reflex To Free T4     Standing Status:   Future     Number of Occurrences:   1     Standing Expiration Date:   2/8/2026    TdaP (Adacel, Boostrix) [[id number]]     Requested Prescriptions     Signed Prescriptions Disp Refills    Tirzepatide-Weight Management (ZEPBOUND) 2.5 MG/0.5ML Subcutaneous Solution Auto-injector 2 mL 0     Sig: Inject 2.5 mg into the skin once a week.         Miriam Martinez DO  CC:  Chief Complaint   Patient presents with    Physical     Reviewed Preventative/Wellness form with patient.          HPI:   Kamille Bass is a 34-year-old female who presents for a physical and to discuss her weight    Obesity: Wegovy was helping  but it was out of stock.  She is wondering what else she can try now.  Wegovy was also worsening her acid reflux symptoms.  She continues to deny any personal or family history of medullary thyroid cancer or pancreatitis.    Past Medical History:    Allergic rhinitis    Chronic bilateral low back pain without sciatica    had been from a pulled muscle. Had done PT. Issues come & go     Fatty liver    CT a/p 12/31/19 - Stable hepatomegaly and fatty infiltration of the liver.      Gastroesophageal reflux disease without esophagitis    Hx of concussion    around 11 years old    Kidney stones    last episode 2013     Morbid obesity with BMI of 45.0-49.9, adult (HCC)    Phentermine caused tachycardia     Pap smear for cervical cancer screening    Pap & HPV negative.     Spinal stenosis    says she has narrowing of her spinal column & some mild disc herniation in cervical spine.     Vitamin B12 deficiency    Vitamin D deficiency       Past Surgical History:   Procedure Laterality Date    Laparoscopic cholecystectomy  6/28/2013    Procedure: LAPAROSCOPIC CHOLECYSTECTOMY;  Surgeon: Mayra Valdez MD;  Location: [facility] OR    Lithotripsy      Tonsillectomy  2002    [place] teeth removed  2008       Social History:  Social History     Socioeconomic History    Marital status: Single   Tobacco Use    Smoking status: Never    Smokeless tobacco: Never   Vaping Use    Vaping status: Never Used   Substance and Sexual Activity    Alcohol use: Yes     Comment: Social - at a party - occasional     Drug use: No    Sexual activity: Never     Birth control/protection: OCP   Other Topics Concern    Caffeine Concern Yes     Comment: rare use    Stress Concern No    Weight Concern Yes    Special Diet No    Exercise No    Seat Belt Yes     Social Drivers of Health     Food Insecurity: No Food Insecurity (2/8/2025)    NCSS - Food Insecurity     Worried About Running Out of Food in the Last Year: No     Ran Out of Food in the Last Year: No    Transportation Needs: No Transportation Needs (2/8/2025)    NCSS - Transportation     Lack of Transportation: No   Housing Stability: Not At Risk (2/8/2025)    NCSS - Housing/Utilities     Has Housing: Yes     Worried About Losing Housing: No     Unable to Get Utilities: No     Family History:  Family History   Problem Relation Age of Onset    Hypertension Father 55    Diabetes Father 60    Other (Arthritis) Mother     Other (stress urinary incontinence) Mother     Other (dysmenorrhea) Sister     Diabetes Maternal Grandmother         unknown    Heart Disease Maternal Grandmother         unknown    Heart Disease Maternal Grandfather         unknown    Heart Disorder Paternal Grandmother     Heart Disease Paternal Grandmother     Other (Other) Paternal Grandmother     Diabetes Paternal Grandfather     Heart Disease Paternal Grandfather     Breast Cancer Neg     Ovarian Cancer Neg     Colon Cancer Neg     Infertility Neg     Endometriosis Neg     Thyroid Cancer Neg      Allergies:  Allergies[1]  Current Meds:  Medications Ordered Prior to Encounter[2]      REVIEW OF SYSTEMS   Constitutional: no fatigue normal energy no weight changes   HENT: normal sinuses and no mouth issues   Eyes: . normal vision no eye pain   Respiratory: normal respirations no cough   Cardiovascular: no CP, or palpitations   Gastrointestinal: normal bowels and no abd pains   Genitourinary:  normal urination no hematuria, no frequency   Musculoskeletal: no pains in arms/legs, normal range of motion   Skin: no rashes or skin lesions that are new   Neurological:  no weakness, no numbness, normal gait   Hematological:  no bruises or bleeding   Psychiatric/Behavioral: normal mood no anxiety normal behavior     /70 (BP Location: Right arm, Patient Position: Sitting, Cuff Size: large)   Pulse 82   Temp 98.1 °F (36.7 °C) (Temporal)   Ht 5' 2\" (1.575 m)   Wt 270 lb (122.5 kg)   SpO2 98%   BMI 49.38 kg/m²     PHYSICAL EXAM:   Constitutional:  Vital signs reviewed as noted, well developed, in no acute distress.   HENT: NCAT, bilateral ear canal and tympanic membrane appear normal  Eyes: pupils reactive bilaterally  Neck: No thyroidmegaly  Cardiovascular: nl s1 s2 no m/r/g  Pulmonary/Chest: CTA bilaterally with no wheezes  Abdominal: Soft NT normal Bowel sounds  Extremities: no pedal edema   Neurological:  no weakness in UE and LE, reflexes are normal  Skin: no rashes or bruises on visualized skin, not undressed   Psychiatric:normal mood              [1]   Allergies  Allergen Reactions    Latex RASH     RASH FROM LATEX GLOVES. LIPS TINGLE WHEN BLOWING UP BALLOONS.    Topiramate OTHER (SEE COMMENTS)     Numbness and tingling     [2]   Current Outpatient Medications on File Prior to Visit   Medication Sig Dispense Refill    Drospiren-Eth Estrad-Levomefol (BEYAZ) 3-0.02-0.451 MG Oral Tab Take 1 tablet by mouth daily. 84 tablet 4    MONTELUKAST 10 MG Oral Tab TAKE ONE TABLET BY MOUTH ONE TIME DAILY (Patient taking differently: Take 1 tablet (10 mg total) by mouth once as needed.) 90 tablet 0    cholecalciferol (VITAMIN D3) 125 MCG (5000 UT) Oral Cap Take 1 capsule (5,000 Units total) by mouth daily.      cyanocobalamin 1000 MCG Oral Tab Take 1 tablet (1,000 mcg total) by mouth daily. (Patient not taking: Reported on 2/8/2025)       Current Facility-Administered Medications on File Prior to Visit   Medication Dose Route Frequency Provider Last Rate Last Admin    cyanocobalamin (VITAMIN B12) 1000 MCG/ML injection 1,000 mcg  1,000 mcg Intramuscular Once Miriam Martinez DO

## 2025-02-10 ENCOUNTER — OFFICE VISIT (OUTPATIENT)
Dept: PODIATRY CLINIC | Facility: CLINIC | Age: 35
End: 2025-02-10
Payer: COMMERCIAL

## 2025-02-10 DIAGNOSIS — L60.0 INGROWING LEFT GREAT TOENAIL: Primary | ICD-10-CM

## 2025-02-10 DIAGNOSIS — L60.0 INGROWING RIGHT GREAT TOENAIL: ICD-10-CM

## 2025-02-10 PROCEDURE — 99203 OFFICE O/P NEW LOW 30 MIN: CPT | Performed by: PODIATRIST

## 2025-02-11 RX ORDER — TIRZEPATIDE 2.5 MG/.5ML
2.5 INJECTION, SOLUTION SUBCUTANEOUS WEEKLY
Qty: 2 ML | Refills: 0 | Status: SHIPPED | OUTPATIENT
Start: 2025-02-11

## 2025-02-11 NOTE — TELEPHONE ENCOUNTER
SV: Pt requesting Zepbound be sent to [facility]. Pended order, please sign if agree, TY!    LOV 2/8/25:  2. Morbid obesity with BMI of 45.0-49.9, adult (HCC)  Note: Had tolerated Wegovy in the past but it caused acid reflux.  Will start Zepbound.  - Tirzepatide-Weight Management (ZEPBOUND) 2.5 MG/0.5ML Subcutaneous Solution Auto-injector; Inject 2.5 mg into the skin once a week.  Dispense: 2 mL; Refill: 0

## 2025-02-12 NOTE — PROGRESS NOTES
Kamille Bass is a 34 year old female.   Chief Complaint   Patient presents with    New Patient     Ingrown bilateral hallux- denies pain         HPI:   This pleasant patient presents to the clinic clinic complaining of painful ingrown toenails on both edges of her great toe.  At today's visit reviewed nurse's history as taken above, allergies medications and medical history as documented below.  All changes duly noted  Allergies: Latex and Topiramate   Current Outpatient Medications   Medication Sig Dispense Refill    Tirzepatide-Weight Management (ZEPBOUND) 2.5 MG/0.5ML Subcutaneous Solution Auto-injector Inject 2.5 mg into the skin once a week. 2 mL 0    Drospiren-Eth Estrad-Levomefol (BEYAZ) 3-0.02-0.451 MG Oral Tab Take 1 tablet by mouth daily. 84 tablet 4    cyanocobalamin 1000 MCG Oral Tab Take 1 tablet (1,000 mcg total) by mouth daily.      MONTELUKAST 10 MG Oral Tab TAKE ONE TABLET BY MOUTH ONE TIME DAILY (Patient taking differently: Take 1 tablet (10 mg total) by mouth once as needed.) 90 tablet 0    cholecalciferol (VITAMIN D3) 125 MCG (5000 UT) Oral Cap Take 1 capsule (5,000 Units total) by mouth daily.        Past Medical History:    Allergic rhinitis    Chronic bilateral low back pain without sciatica    had been from a pulled muscle. Had done PT. Issues come & go     Fatty liver    CT a/p 12/31/19 - Stable hepatomegaly and fatty infiltration of the liver.      Gastroesophageal reflux disease without esophagitis    Hx of concussion    around 11 years old    Kidney stones    last episode 2013     Morbid obesity with BMI of 45.0-49.9, adult (HCC)    Phentermine caused tachycardia     Pap smear for cervical cancer screening    Pap & HPV negative.     Spinal stenosis    says she has narrowing of her spinal column & some mild disc herniation in cervical spine.     Vitamin B12 deficiency    Vitamin D deficiency      Past Surgical History:   Procedure Laterality Date    Laparoscopic cholecystectomy  6/28/2013     Procedure: LAPAROSCOPIC CHOLECYSTECTOMY;  Surgeon: Mayra Valdez MD;  Location: [facility] MAIN OR    Lithotripsy      Tonsillectomy  2002    [place] teeth removed  2008      Family History   Problem Relation Age of Onset    Hypertension Father 55    Diabetes Father 60    Other (Arthritis) Mother     Other (stress urinary incontinence) Mother     Other (dysmenorrhea) Sister     Diabetes Maternal Grandmother         unknown    Heart Disease Maternal Grandmother         unknown    Heart Disease Maternal Grandfather         unknown    Heart Disorder Paternal Grandmother     Heart Disease Paternal Grandmother     Other (Other) Paternal Grandmother     Diabetes Paternal Grandfather     Heart Disease Paternal Grandfather     Breast Cancer Neg     Ovarian Cancer Neg     Colon Cancer Neg     Infertility Neg     Endometriosis Neg     Thyroid Cancer Neg       Social History     Socioeconomic History    Marital status: Single   Tobacco Use    Smoking status: Never    Smokeless tobacco: Never   Vaping Use    Vaping status: Never Used   Substance and Sexual Activity    Alcohol use: Yes     Comment: Social - at a party - occasional     Drug use: No    Sexual activity: Never     Birth control/protection: OCP   Other Topics Concern    Caffeine Concern Yes     Comment: rare use    Stress Concern No    Weight Concern Yes    Special Diet No    Exercise No    Seat Belt Yes           REVIEW OF SYSTEMS:   Today reviewed systens as documented below  GENERAL HEALTH: feels well otherwise  SKIN: Refer to exam below  RESPIRATORY: denies shortness of breath with exertion  CARDIOVASCULAR: denies chest pain on exertion  GI: denies abdominal pain and denies heartburn  NEURO: denies headaches    EXAM:   There were no vitals taken for this visit.  GENERAL: well developed, well nourished, in no apparent distress  EXTREMITIES:   1. Integument: Skin on her feet is warm dry and supple.  The medial lateral nail borders are incurvated the lateral nail  border seems to be the most symptomatic on both great toes but she is also has symptoms at the medial nail border as well.  There has been a history of infection.  Currently there are no signs of infection.   2. Vascular: The patient has palpable dorsalis pedis posterior tibial pulses on both feet   3. Neurologic: Patient has intact sensorium   4. Musculoskeletal: Patient has good muscle strength and she is ambulatory.    ASSESSMENT AND PLAN:   Diagnoses and all orders for this visit:    Ingrowing left great toenail    Ingrowing right great toenail        Plan: At today's office visit I discussed different treatments with her including permanent correction which she wants to proceed with because they have been bothering her for so long.  I explained the nature of the procedure to her which would be a phenol and alcohol technique and that it would leave her nails slightly more narrow but she said she is okay with that as long as they do not hurt.  Will get her an appointment to do all 4 borders.    The patient indicates understanding of these issues and agrees to the plan.    Tarun Crow DPM

## 2025-02-20 ENCOUNTER — TELEPHONE (OUTPATIENT)
Dept: PODIATRY CLINIC | Facility: CLINIC | Age: 35
End: 2025-02-20

## 2025-02-20 NOTE — TELEPHONE ENCOUNTER
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 I told her that I would keep that appointment booked just in case she wants to proceed with the procedure. She states that Dr Crow recommended patient [facility]. I booked her for 3/17/25 consult at 4:30 with Dr Holly. She accepted

## 2025-02-20 NOTE — TELEPHONE ENCOUNTER
Patient asking to change 3/31 procedure to a regular follow up appointment to \"clean up\" ingrown toenails. Patient also asking who Dr. Crow wanted her to see for the annie. Please call.

## 2025-03-15 DIAGNOSIS — E66.01 MORBID OBESITY WITH BMI OF 45.0-49.9, ADULT (HCC): ICD-10-CM

## 2025-03-17 ENCOUNTER — OFFICE VISIT (OUTPATIENT)
Dept: PODIATRY CLINIC | Facility: CLINIC | Age: 35
End: 2025-03-17
Payer: COMMERCIAL

## 2025-03-17 ENCOUNTER — TELEPHONE (OUTPATIENT)
Dept: PODIATRY CLINIC | Facility: CLINIC | Age: 35
End: 2025-03-17

## 2025-03-17 DIAGNOSIS — M20.5X1 ACQUIRED ADDUCTOVARUS ROTATION OF TOE OF RIGHT FOOT: ICD-10-CM

## 2025-03-17 DIAGNOSIS — L84 FOOT CALLUS: ICD-10-CM

## 2025-03-17 DIAGNOSIS — M79.674 TOE PAIN, RIGHT: ICD-10-CM

## 2025-03-17 DIAGNOSIS — M20.41 HAMMER TOE OF RIGHT FOOT: Primary | ICD-10-CM

## 2025-03-17 PROCEDURE — 99214 OFFICE O/P EST MOD 30 MIN: CPT | Performed by: STUDENT IN AN ORGANIZED HEALTH CARE EDUCATION/TRAINING PROGRAM

## 2025-03-17 RX ORDER — TIRZEPATIDE 2.5 MG/.5ML
2.5 INJECTION, SOLUTION SUBCUTANEOUS WEEKLY
Qty: 2 ML | Refills: 0 | OUTPATIENT
Start: 2025-03-17

## 2025-03-17 RX ORDER — TIRZEPATIDE 5 MG/.5ML
5 INJECTION, SOLUTION SUBCUTANEOUS WEEKLY
Qty: 6 ML | Refills: 0 | Status: SHIPPED | OUTPATIENT
Start: 2025-03-17 | End: 2025-06-02

## 2025-03-17 NOTE — TELEPHONE ENCOUNTER
Procedure: De rotational arthroplasty with possible extensor tendon lengthening, right 5th toe  CPT code:   Length of Surgery: 26549  Any Instruments: 30 minutes  Call patient: ASAP  Anesthesia: MAC  [facility]: [facility]  Assistance: none  Pacemaker: No  Anticoagulants: No  Nickel Allergy: No  Latex Allergy: No  Diagnosis/ICD Code:     ICD-10-CM    1. Hammer toe of right foot  M20.41       2. 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 Foot callus  L84

## 2025-03-17 NOTE — PROGRESS NOTES
[facility] Podiatry  Progress Note    Kamille Bass is a 34 year old female.   Chief Complaint   Patient presents with    Consult     Hammer toe right little toe. Has appointment on the 31st with Dr. Crow, for the ingrown toe nails.         HPI:     Patient is a pleasant 34-year-old female who presents to clinic for evaluation of painful hammertoe to her right fifth toe.  She relates that she has broken site in the past.  She has developed hammertoe to site which causes painful callus to build up on the outside of her toe.  She is here to discuss surgical treatment options for site.  She is wondering what treatment options are available.  No other complaints are mentioned.  Past medical history, medications, allergies reviewed.  She does follow with Dr. Crow for management of her ingrown nails.  No other complaints are mentioned.      Allergies: Latex and Topiramate   Current Outpatient Medications   Medication Sig Dispense Refill    Tirzepatide-Weight Management (ZEPBOUND) 5 MG/0.5ML Subcutaneous Solution Auto-injector Inject 5 mg into the skin once a week for 4 doses. 6 mL 0    Drospiren-Eth Estrad-Levomefol (BEYAZ) 3-0.02-0.451 MG Oral Tab Take 1 tablet by mouth daily. 84 tablet 4    cyanocobalamin 1000 MCG Oral Tab Take 1 tablet (1,000 mcg total) by mouth daily.      MONTELUKAST 10 MG Oral Tab TAKE ONE TABLET BY MOUTH ONE TIME DAILY (Patient taking differently: Take 1 tablet (10 mg total) by mouth once as needed.) 90 tablet 0    cholecalciferol (VITAMIN D3) 125 MCG (5000 UT) Oral Cap Take 1 capsule (5,000 Units total) by mouth daily.        Past Medical History:    Allergic rhinitis    Chronic bilateral low back pain without sciatica    had been from a pulled muscle. Had done PT. Issues come & go     Fatty liver    CT a/p 12/31/19 - Stable hepatomegaly and fatty infiltration of the liver.      Gastroesophageal reflux disease without esophagitis    Hx of concussion    around 11 years old    Kidney stones     last episode 2013     Morbid obesity with BMI of 45.0-49.9, adult (HCC)    Phentermine caused tachycardia     Pap smear for cervical cancer screening    Pap & HPV negative.     Spinal stenosis    says she has narrowing of her spinal column & some mild disc herniation in cervical spine.     Vitamin B12 deficiency    Vitamin D deficiency      Past Surgical History:   Procedure Laterality Date    Laparoscopic cholecystectomy  6/28/2013    Procedure: LAPAROSCOPIC CHOLECYSTECTOMY;  Surgeon: Mayra Valdez MD;  Location: [facility] MAIN OR    Lithotripsy      Tonsillectomy  2002    [place] teeth removed  2008      Family History   Problem Relation Age of Onset    Hypertension Father 55    Diabetes Father 60    Other (Arthritis) Mother     Other (stress urinary incontinence) Mother     Other (dysmenorrhea) Sister     Diabetes Maternal Grandmother         unknown    Heart Disease Maternal Grandmother         unknown    Heart Disease Maternal Grandfather         unknown    Heart Disorder Paternal Grandmother     Heart Disease Paternal Grandmother     Other (Other) Paternal Grandmother     Diabetes Paternal Grandfather     Heart Disease Paternal Grandfather     Breast Cancer Neg     Ovarian Cancer Neg     Colon Cancer Neg     Infertility Neg     Endometriosis Neg     Thyroid Cancer Neg       Social History     Socioeconomic History    Marital status: Single   Tobacco Use    Smoking status: Never    Smokeless tobacco: Never   Vaping Use    Vaping status: Never Used   Substance and Sexual Activity    Alcohol use: Yes     Comment: Social - at a party - occasional     Drug use: No    Sexual activity: Never     Birth control/protection: OCP   Other Topics Concern    Caffeine Concern Yes     Comment: rare use    Stress Concern No    Weight Concern Yes    Special Diet No    Exercise No    Seat Belt Yes           REVIEW OF SYSTEMS:     No n/v/f/c.      EXAM:   There were no vitals taken for this visit.  GENERAL: well developed, well  nourished, in no apparent distress  EXTREMITIES:       1. Integument: Normal skin temperature and turgor. HPK over PIPJ of right 5th toe.  2. Vascular: Dorsalis pedis two out of four bilateral and posterior tibial pulses two out of   four bilateral, capillary refill normal.   3. Musculoskeletal: All muscle groups are graded 5 out of 5 in the foot and ankle.  Adductovarus rotation of right fifth toe.  Pain to callus site.   4. Neurological: Normal sharp dull sensation; reflexes normal.      Right foot x-rays: 3/17/2025:    Slight adductovarus rotation to right fifth toe.  No acute fractures or  osseous abnormalities.           ASSESSMENT AND PLAN:   Diagnoses and all orders for this visit:    Hammer toe of right foot  -     [facility] AMB POD XR - RT FOOT 3 VIEWS(AP,LAT,OBLIQUE)WT BEARING    Acquired adductovarus rotation of toe of right foot    Foot callus    Toe pain, right        Plan:    -Patient examined, chart history reviewed.  -Discussed etiology of condition and various treatment options.  -X-rays obtained reviewed.  Adductovarus rotation to right fifth toe.  No acute fractures or osseous abnormalities.  -Discussed treatment options at length including conservative and surgical treatment options.  Patient has exhausted conservative treatment options including regular callus debridement and supportive shoes with wide toe box.  She is interested in more definitive treatment at this time.  -Surgically, discussed with patient that I would recommend derotational arthroplasty to right fifth toe with possible extensor tendon lengthening.  This was discussed including benefits, risks, recovery period.  Patient is interested in scheduling surgery.    All treatment options have been discussed with the patient including both conservative and surgical attempts at correction. Potential risks and complications of surgical intervention were discussed at length which including but not not limited to death, loss of limb, post op  pain, swelling, infection, bleeding, reoccurrence of the deformity, extended healing, and the possibility of further and future surgery.  No guarantees have been made to the patient.    Our  will reach out to find time that works for patient.  The patient indicates understanding of these issues and agrees to the plan.        Bhupinder Holly DPM    Dragon speech recognition software was used to prepare this note.  Errors in word recognition may occur.  Please contact me with any questions/concerns with this note.

## 2025-03-17 NOTE — TELEPHONE ENCOUNTER
Requesting    Name from pharmacy: Zepbound 2.5 Mg/0.5ml Inj Lill         Will file in chart as: ZEPBOUND 2.5 MG/0.5ML Subcutaneous Solution Auto-injector    Sig: Inject 2.5 mg into the skin once a week.    Disp: 2 mL    Refills: 0    Start: 3/15/2025    Class: Normal    Non-formulary For: Morbid obesity with BMI of 45.0-49.9, adult (HCC)    Last ordered: 1 month ago (2/11/2025) by Miriam Martinez DO    Last refill: 2/13/2025    Rx #: [id number]       To be filled at: [id number] - [place], [place] - [address] [place] [address] [phone], [phone]     LOV: 02/08/25 w/ SV  RTC: 03/22/25   Last Relevant Labs: 02/08/25     Future Appointments   Date Time Provider Department Center   3/17/2025  4:30 PM Bhupinder Holly DPM [id number]   3/24/2025  4:30 PM Miriam Martinez DO [facility] [place]   3/31/2025  9:00 AM Tarun Crow DPM [id number]

## 2025-03-20 ENCOUNTER — TELEPHONE (OUTPATIENT)
Dept: INTERNAL MEDICINE CLINIC | Facility: CLINIC | Age: 35
End: 2025-03-20

## 2025-03-20 NOTE — TELEPHONE ENCOUNTER
Per SV, pt needs to be switched to VV at anytime on Monday 3/24.    LM for pt to return call to see if we can switch appt to VV anytime before 3:30pm.

## 2025-03-21 ENCOUNTER — TELEPHONE (OUTPATIENT)
Dept: INTERNAL MEDICINE CLINIC | Facility: CLINIC | Age: 35
End: 2025-03-21

## 2025-03-21 NOTE — TELEPHONE ENCOUNTER
Pt is returning the call in regards to rescheduling her appt on 3/24. Pt informed of appt notes, but she wants to know if SV can squeeze her in on 3/31 around 10am since she has another appt this day? If not possible, then she is willing to switch to VVR, she just would like to see SV in office.    Please advise.

## 2025-03-24 ENCOUNTER — TELEMEDICINE (OUTPATIENT)
Dept: INTERNAL MEDICINE CLINIC | Facility: CLINIC | Age: 35
End: 2025-03-24
Payer: COMMERCIAL

## 2025-03-24 DIAGNOSIS — E66.01 MORBID OBESITY WITH BMI OF 45.0-49.9, ADULT (HCC): Primary | ICD-10-CM

## 2025-03-24 DIAGNOSIS — K21.9 GASTROESOPHAGEAL REFLUX DISEASE WITHOUT ESOPHAGITIS: ICD-10-CM

## 2025-03-24 DIAGNOSIS — L23.9 ALLERGIC DERMATITIS: ICD-10-CM

## 2025-03-24 NOTE — PROGRESS NOTES
Virtual Telephone Check-In    This visit is conducted using Telemedicine with live, interactive video and audio. Patient resides in [place]   Patient understands and accepts financial responsibility for any deductible, co-insurance and/or co-pays associated with this service.    [facility]  Telehealth Verbal Consent   I conducted a telehealth visit with [facility] on 3/24/2025   which was completed using two-way, real-time interactive audio and video  communication. This has been done in good lis to provide continuity of care in the best interest of the provider-patient relationship, due to the COVID -19 public health crisis/national emergency where restrictions of face-to-face office visits are ongoing. Every conscious effort was taken to allow for sufficient and adequate time to complete the visit.  The patient was made aware of the limitations of the telehealth visit, including treatment limitations as no physical exam could be performed.  The patient was advised to call 911 or to go to the ER in case there was an emergency.  The patient was also advised of the potential privacy & security concerns related to the telehealth platform.   The patient was made aware of where to find [facility]'s notice of privacy practices, telehealth consent form and other related consent forms and documents.  which are located on the [facility] website. The patient verbally agreed to telehealth consent form, related consents and the risks discussed.    Lastly, the patient confirmed that they were in [place].   Included in this visit, time may have been spent reviewing labs, medications, radiology tests and decision making. Appropriate medical decision-making and tests are ordered as detailed in the plan of care above.  Coding/billing information is submitted for this visit based on complexity of care and/or time spent for the visit.  Time spent: 20 minutes (including chart review/documentation)  HPI: Kamille levin a  34-year-old female who presents for a weight check.  She is down to 257 pounds and is overall tolerating it well.  She has noticed that the day after she injects herself she gets an irritation around the injection site that is itchy and raised.  It has happened twice.  She does not have any other reaction.  She gets a little bit of acid reflux as well but she is able to tolerate it and modify her diet.  ROS:  General: Feels well overall  Skin: Denies any unusual skin lesions  Eyes: Denies blurred vision or double vision  All systems negative, except for above  Physical:  GENERAL: Alert and oriented, well developed, well nourished,in no apparent distress  SKIN: no rashes,no suspicious lesions on face  LUNGS: no audible wheezing  PSYCH: pleasant, appropriate mood and affect    Assessment and Plan  1. Morbid obesity with BMI of 45.0-49.9, adult (HCC)  Note: Will continue the Zepbound at 5 mg.  Her start weight was 270 pounds.  Needs to continue with diet and lifestyle modifications    2. Allergic dermatitis  Note: The reaction seems localized.  Discussed how to properly take the injection and she can take the montelukast the day of or the next day of the injection.  She can also consider Zyrtec and hydrocortisone cream on the site, she will notify us sooner if she has any other reactions    3. Gastroesophageal reflux disease without esophagitis  Note: Managing it through diet but if it worsens she can take a Pepcid or omeprazole    Return to clinic in 3 months or sooner for any acute concerns  Miriam Martinez DO

## 2025-03-31 ENCOUNTER — OFFICE VISIT (OUTPATIENT)
Dept: PODIATRY CLINIC | Facility: CLINIC | Age: 35
End: 2025-03-31
Payer: COMMERCIAL

## 2025-03-31 DIAGNOSIS — M79.675 PAIN IN TOES OF BOTH FEET: ICD-10-CM

## 2025-03-31 DIAGNOSIS — L60.0 INGROWING LEFT GREAT TOENAIL: Primary | ICD-10-CM

## 2025-03-31 DIAGNOSIS — M79.674 PAIN IN TOES OF BOTH FEET: ICD-10-CM

## 2025-03-31 DIAGNOSIS — L60.0 INGROWING RIGHT GREAT TOENAIL: ICD-10-CM

## 2025-03-31 PROCEDURE — 99213 OFFICE O/P EST LOW 20 MIN: CPT | Performed by: PODIATRIST

## 2025-03-31 NOTE — PROGRESS NOTES
Kamille Bass is a 34 year old female.   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  At today's visit reviewed nurse's history as taken above, allergies medications and medical history as documented below.  All changes duly noted  Allergies: Latex and Topiramate   Current Outpatient Medications   Medication Sig Dispense Refill    Tirzepatide-Weight Management (ZEPBOUND) 5 MG/0.5ML Subcutaneous Solution Auto-injector Inject 5 mg into the skin once a week for 4 doses. 6 mL 0    Drospiren-Eth Estrad-Levomefol (BEYAZ) 3-0.02-0.451 MG Oral Tab Take 1 tablet by mouth daily. 84 tablet 4    cyanocobalamin 1000 MCG Oral Tab Take 1 tablet (1,000 mcg total) by mouth daily.      MONTELUKAST 10 MG Oral Tab TAKE ONE TABLET BY MOUTH ONE TIME DAILY (Patient taking differently: Take 1 tablet (10 mg total) by mouth once as needed.) 90 tablet 0    cholecalciferol (VITAMIN D3) 125 MCG (5000 UT) Oral Cap Take 1 capsule (5,000 Units total) by mouth daily.        Past Medical History:    Allergic rhinitis    Chronic bilateral low back pain without sciatica    had been from a pulled muscle. Had done PT. Issues come & go     Fatty liver    CT a/p 12/31/19 - Stable hepatomegaly and fatty infiltration of the liver.      Gastroesophageal reflux disease without esophagitis    Hx of concussion    around 11 years old    Kidney stones    last episode 2013     Morbid obesity with BMI of 45.0-49.9, adult (HCC)    Phentermine caused tachycardia     Pap smear for cervical cancer screening    Pap & HPV negative.     Spinal stenosis    says she has narrowing of her spinal column & some mild disc herniation in cervical spine.     Vitamin B12 deficiency    Vitamin D deficiency       Past Surgical History:   Procedure Laterality Date    Laparoscopic cholecystectomy  6/28/2013    Procedure: LAPAROSCOPIC CHOLECYSTECTOMY;  Surgeon: Mayra Valdez MD;  Location: [facility] MAIN OR    Lithotripsy      Tonsillectomy  2002    [place] teeth removed  2008      Family History   Problem Relation Age of Onset    Hypertension Father 55    Diabetes Father 60    Other (Arthritis) Mother     Other (stress urinary incontinence) Mother     Other (dysmenorrhea) Sister     Diabetes Maternal Grandmother         unknown    Heart Disease Maternal Grandmother         unknown    Heart Disease Maternal Grandfather         unknown    Heart Disorder Paternal Grandmother     Heart Disease Paternal Grandmother     Other (Other) Paternal Grandmother     Diabetes Paternal Grandfather     Heart Disease Paternal Grandfather     Breast Cancer Neg     Ovarian Cancer Neg     Colon Cancer Neg     Infertility Neg     Endometriosis Neg     Thyroid Cancer Neg       Social History     Socioeconomic History    Marital status: Single   Tobacco Use    Smoking status: Never    Smokeless tobacco: Never   Vaping Use    Vaping status: Never Used   Substance and Sexual Activity    Alcohol use: Yes     Comment: Social - at a party - occasional     Drug use: No    Sexual activity: Never     Birth control/protection: OCP   Other Topics Concern    Caffeine Concern Yes     Comment: rare use    Stress Concern No    Weight Concern Yes    Special Diet No    Exercise No    Seat Belt Yes           REVIEW OF SYSTEMS:   Today reviewed systens as documented below  GENERAL HEALTH: feels well otherwise  SKIN: Refer to exam below  RESPIRATORY: denies shortness of breath with exertion  CARDIOVASCULAR: denies chest pain on exertion  GI: denies abdominal pain and denies heartburn  NEURO: denies headaches    EXAM:   There were no vitals taken for this visit.  GENERAL: well developed, well nourished, in no apparent distress  EXTREMITIES:   1. Integument: The L[place]  with impactions are sensitive to palpation   2. Vascular: Patient has palpable pulse   3. Neurologic: Has intact sensorium   4. Musculoskeletal: Has good muscle    ASSESSMENT AND PLAN:   Diagnoses and all orders for this visit:    Ingrowing left great toenail    Ingrowing right great toenail    Pain in toes of both feet        Plan: At today's office visit using a slant back technique trimmed down debrided the medial and lateral nail borders of the hallux both feet using a curette to remove any impactions Neosporin Band-Aid was applied if they become sensitive she can soak in warm soapy water twice daily and apply Neosporin and a Band-Aid and follow-up with us again to have her nails corrected when she gets time.    The patient indicates understanding of these issues and agrees to the plan.    Tarun Crow DPM

## 2025-04-24 ENCOUNTER — PATIENT MESSAGE (OUTPATIENT)
Dept: INTERNAL MEDICINE CLINIC | Facility: CLINIC | Age: 35
End: 2025-04-24

## 2025-04-24 DIAGNOSIS — E66.01 MORBID OBESITY WITH BMI OF 45.0-49.9, ADULT (HCC): Primary | ICD-10-CM

## 2025-04-25 NOTE — TELEPHONE ENCOUNTER
SV: [facility] requesting for [facility]. Pended referral, please review/edit and sign if agree, TY!

## 2025-05-30 DIAGNOSIS — E66.01 MORBID OBESITY WITH BMI OF 45.0-49.9, ADULT (HCC): ICD-10-CM

## 2025-05-30 NOTE — TELEPHONE ENCOUNTER
Michelle pt    Zepbound 5 mg  Filled 5-5-25  Qty 2 mL  0 refills  Future Appointments   Date Time Provider Department Center   6/19/2025 10:30 AM Miriam Martinez,  [facility] [place]   LOV 2-8-25 SV  RTC 6 wks

## 2025-06-02 RX ORDER — TIRZEPATIDE 5 MG/.5ML
5 INJECTION, SOLUTION SUBCUTANEOUS WEEKLY
Qty: 6 ML | Refills: 0 | Status: SHIPPED | OUTPATIENT
Start: 2025-06-02 | End: 2025-06-24

## 2025-06-02 NOTE — TELEPHONE ENCOUNTER
I asked your patient last week via Signature message if she wanted to increase dose of Zepbound but she did not respond back, thanks

## 2025-06-19 ENCOUNTER — OFFICE VISIT (OUTPATIENT)
Dept: INTERNAL MEDICINE CLINIC | Facility: CLINIC | Age: 35
End: 2025-06-19
Payer: COMMERCIAL

## 2025-06-19 VITALS
DIASTOLIC BLOOD PRESSURE: 70 MMHG | SYSTOLIC BLOOD PRESSURE: 110 MMHG | BODY MASS INDEX: 46.74 KG/M2 | OXYGEN SATURATION: 97 % | RESPIRATION RATE: 16 BRPM | WEIGHT: 254 LBS | HEART RATE: 94 BPM | TEMPERATURE: 98 F | HEIGHT: 62 IN

## 2025-06-19 DIAGNOSIS — E66.01 MORBID OBESITY WITH BMI OF 45.0-49.9, ADULT (HCC): Primary | ICD-10-CM

## 2025-06-19 DIAGNOSIS — K21.9 GASTROESOPHAGEAL REFLUX DISEASE WITHOUT ESOPHAGITIS: ICD-10-CM

## 2025-06-19 DIAGNOSIS — L23.3 ALLERGIC CONTACT DERMATITIS DUE TO DRUGS IN CONTACT WITH SKIN: ICD-10-CM

## 2025-06-19 PROCEDURE — 3078F DIAST BP <80 MM HG: CPT | Performed by: INTERNAL MEDICINE

## 2025-06-19 PROCEDURE — 3008F BODY MASS INDEX DOCD: CPT | Performed by: INTERNAL MEDICINE

## 2025-06-19 PROCEDURE — 99214 OFFICE O/P EST MOD 30 MIN: CPT | Performed by: INTERNAL MEDICINE

## 2025-06-19 PROCEDURE — G2211 COMPLEX E/M VISIT ADD ON: HCPCS | Performed by: INTERNAL MEDICINE

## 2025-06-19 PROCEDURE — 3074F SYST BP LT 130 MM HG: CPT | Performed by: INTERNAL MEDICINE

## 2025-06-19 RX ORDER — TIRZEPATIDE 7.5 MG/.5ML
7.5 INJECTION, SOLUTION SUBCUTANEOUS WEEKLY
Qty: 6 ML | Refills: 0 | Status: SHIPPED | OUTPATIENT
Start: 2025-06-19

## 2025-06-19 NOTE — PROGRESS NOTES
Patient Office Visit    ASSESSMENT AND PLAN:   1. Morbid obesity with BMI of 45.0-49.9, adult (Formerly [facility])  Note: Start weight was 270 pounds.  We will increase the Zepbound dose to 7.5 mg and see how she does with that.  Continue with diet and lifestyle modifications  - Tirzepatide-Weight Management (ZEPBOUND) 7.5 MG/0.5ML Subcutaneous Solution Auto-injector; Inject 7.5 mg into the skin once a week.  Dispense: 6 mL; Refill: 0    2. Gastroesophageal reflux disease without esophagitis  Note: No symptoms since starting Zepbound    3. Allergic contact dermatitis due to drugs in contact with skin  Note: Likely reaction to Zepbound.  She did not have the symptoms at the 2.5 mg dose.  She also noticed that if she took the medicine out of the fridge for an hour then she had a milder reaction.  She will try that again and even consider taking Zyrtec prior to using the injection but if the reaction continues then we can switch the Zepbound to Wegovy however Wegovy did cause acid reflux symptoms    Return to clinic in 3 months          Patient/Caregiver Education: Patient/Caregiver Education: There are no barriers to learning. Medical education done. Outcome: Patient verbalizes understanding. Patient is notified to call with any questions, complications, allergies, or worsening or changing symptoms.  Patient is to call with any side effects or complications from the treatments as a result of today.      Reviewed Past Medical History and   Problem List[1]    No orders of the defined types were placed in this encounter.    Requested Prescriptions     Signed Prescriptions Disp Refills    Tirzepatide-Weight Management (ZEPBOUND) 7.5 MG/0.5ML Subcutaneous Solution Auto-injector 6 mL 0     Sig: Inject 7.5 mg into the skin once a week.         Miriam Martinez DO  CC:  Chief Complaint   Patient presents with    Follow - Up         HPI:   Kamille Bass is a 35-year-old female who presents for routine follow-up    Obesity: She has lost  weight from starting the medicine initially but not so much in the last few months.  She has been really busy at work and has not been working on diet and lifestyle modifications  Allergic reaction: She continues to see that she has an allergic reaction on the abdomen and it happens every time she injects the Zepbound.  She does not have any reaction the day of but the next day the site gets very itchy.  She did not try hydrocortisone or Zyrtec at  GERD: She is happy that she has not had any symptoms    Past Medical History[2]    Past Surgical History[3]    Social History:  Short Social Hx on File[4]  Family History:  Family History[5]  Allergies:  Allergies[6]  Current Meds:  Medications Ordered Prior to Encounter[7]      REVIEW OF SYSTEMS   Constitutional: no fatigue normal energy no weight changes   HENT: normal sinuses and no mouth issues   Eyes: . normal vision no eye pain   Respiratory: normal respirations no cough   Cardiovascular: no CP, or palpitations   Gastrointestinal: normal bowels and no abd pains   Genitourinary:  normal urination no hematuria, no frequency   Musculoskeletal: no pains in arms/legs, normal range of motion   Skin: no rashes or skin lesions that are new   Neurological:  no weakness, no numbness, normal gait   Hematological:  no bruises or bleeding   Psychiatric/Behavioral: normal mood no anxiety normal behavior     /70 (BP Location: Left arm, Patient Position: Sitting, Cuff Size: large)   Pulse 94   Temp 98 °F (36.7 °C) (Temporal)   Resp 16   Ht 5' 2\" (1.575 m)   Wt 254 lb (115.2 kg)   SpO2 97%   BMI 46.46 kg/m²     PHYSICAL EXAM:   Constitutional: Vital signs reviewed as noted, well developed, in no acute distress.   HENT: NCAT  Eyes: pupils reactive bilaterally  Cardiovascular: nl s1 s2 no m/r/g  Pulmonary/Chest: CTA bilaterally with no wheezes  Extremities: no pedal edema   Neurological:  no weakness in UE and LE, reflexes are normal  Skin: no rashes or bruises on  visualized skin, not undressed   Psychiatric:normal mood              [1]   Patient Active Problem List  Diagnosis    Allergic rhinitis    Morbid obesity with BMI of 45.0-49.9, adult (HCC)    Gastroesophageal reflux disease without esophagitis    Vitamin B12 deficiency    S/P cholecystectomy    History of kidney stones   [2]   Past Medical History:   Allergic rhinitis    Chronic bilateral low back pain without sciatica    had been from a pulled muscle. Had done PT. Issues come & go     Fatty liver    CT a/p 12/31/19 - Stable hepatomegaly and fatty infiltration of the liver.      Gastroesophageal reflux disease without esophagitis    Hx of concussion    around 11 years old    Kidney stones    last episode 2013     Morbid obesity with BMI of 45.0-49.9, adult (HCC)    Phentermine caused tachycardia     Pap smear for cervical cancer screening    Pap & HPV negative.     Spinal stenosis    says she has narrowing of her spinal column & some mild disc herniation in cervical spine.     Vitamin B12 deficiency    Vitamin D deficiency   [3]   Past Surgical History:  Procedure Laterality Date    Laparoscopic cholecystectomy  6/28/2013    Procedure: LAPAROSCOPIC CHOLECYSTECTOMY;  Surgeon: Mayra Valdez MD;  Location: [facility] MAIN OR    Lithotripsy      Tonsillectomy  2002    [place] teeth removed  2008   [4]   Social History  Socioeconomic History    Marital status: Single   Tobacco Use    Smoking status: Never    Smokeless tobacco: Never   Vaping Use    Vaping status: Never Used   Substance and Sexual Activity    Alcohol use: Yes     Comment: Social - at a party - occasional     Drug use: No    Sexual activity: Never     Birth control/protection: OCP   Other Topics Concern    Caffeine Concern Yes     Comment: rare use    Stress Concern No    Weight Concern Yes    Special Diet No    Exercise No    Seat Belt Yes     Social Drivers of Health     Food Insecurity: No Food Insecurity (2/8/2025)    NCSS - Food Insecurity     Worried About  Running Out of Food in the Last Year: No     Ran Out of Food in the Last Year: No   Transportation Needs: No Transportation Needs (2/8/2025)    NCSS - Transportation     Lack of Transportation: No   Housing Stability: Not At Risk (2/8/2025)    NCSS - Housing/Utilities     Has Housing: Yes     Worried About Losing Housing: No     Unable to Get Utilities: No   [5]   Family History  Problem Relation Age of Onset    Hypertension Father 55    Diabetes Father 60    Other (Arthritis) Mother     Other (stress urinary incontinence) Mother     Other (dysmenorrhea) Sister     Diabetes Maternal Grandmother         unknown    Heart Disease Maternal Grandmother         unknown    Heart Disease Maternal Grandfather         unknown    Heart Disorder Paternal Grandmother     Heart Disease Paternal Grandmother     Other (Other) Paternal Grandmother     Diabetes Paternal Grandfather     Heart Disease Paternal Grandfather     Breast Cancer Neg     Ovarian Cancer Neg     Colon Cancer Neg     Infertility Neg     Endometriosis Neg     Thyroid Cancer Neg    [6]   Allergies  Allergen Reactions    Latex RASH     RASH FROM LATEX GLOVES. LIPS TINGLE WHEN BLOWING UP BALLOONS.    Topiramate OTHER (SEE COMMENTS)     Numbness and tingling     [7]   Current Outpatient Medications on File Prior to Visit   Medication Sig Dispense Refill    Drospiren-Eth Estrad-Levomefol (BEYAZ) 3-0.02-0.451 MG Oral Tab Take 1 tablet by mouth daily. 84 tablet 4    cyanocobalamin 1000 MCG Oral Tab Take 1 tablet (1,000 mcg total) by mouth daily.      MONTELUKAST 10 MG Oral Tab TAKE ONE TABLET BY MOUTH ONE TIME DAILY (Patient taking differently: Take 1 tablet (10 mg total) by mouth once as needed (as needed).) 90 tablet 0    cholecalciferol (VITAMIN D3) 125 MCG (5000 UT) Oral Cap Take 1 capsule (5,000 Units total) by mouth daily.       Current Facility-Administered Medications on File Prior to Visit   Medication Dose Route Frequency Provider Last Rate Last Admin     cyanocobalamin (VITAMIN B12) 1000 MCG/ML injection 1,000 mcg  1,000 mcg Intramuscular Once Miriam Martinez DO

## 2025-06-19 NOTE — PATIENT INSTRUCTIONS
Try to take out the Zepbound outside of the fridge for 1 to 2 hours and see if the reaction happens.  You can also consider taking a Zyrtec or Claritin prior to using the injection    If it still persist then we should consider switching Zepbound to Wegovy    Continue with diet and lifestyle modification    See me back in 3 months or sooner

## (undated) DIAGNOSIS — E66.01 MORBID OBESITY WITH BMI OF 45.0-49.9, ADULT (HCC): Primary | ICD-10-CM

## (undated) DIAGNOSIS — M54.16 LUMBAR RADICULOPATHY: Primary | ICD-10-CM

## (undated) NOTE — MR AVS SNAPSHOT
[place]  [address] [facility] [address]  [address] [facility] [address] [phone]               Thank you for choosing us for your health care visit with Cedrick Hardin MD.  We are glad to serve you and happy to provide you with this summa Phone:  [phone]    - betamethasone dipropionate 0.05 % Crea  - MetFORMIN HCl 500 MG Tabs            MyChart     Visit MyChart  You can access your MyChart to more actively manage your health care and view more details from this visit by going to htt

## (undated) NOTE — MR AVS SNAPSHOT
[place]  [address] [facility]  [address] [facility] [address] [phone]               Thank you for choosing us for your health care visit with Bessie Molina MD.  We are glad to serve you and happy to provide you with this summa - MetFORMIN HCl 500 MG Tabs      You can get these medications from any pharmacy     Bring a paper prescription for each of these medications    - Phentermine HCl 37.5 MG Tabs            MyChart     Visit [employer]  You can access your MyChart to more activ

## (undated) NOTE — LETTER
Date: 9/16/2024    Patient Name: Kamille Bass          To Whom it may concern:    This letter has been written at the patient's request. The above patient was seen at [facility] for treatment of a medical condition.    This patient should be excused from attending work on 09/16/2024.    The patient may return to work on 09/17/24 with no limitations as long as fever free for 24 hours without use of fever reducing agents.        Sincerely,      Laurita Stark, APRN

## (undated) NOTE — LETTER
Date: 4/17/2018    Patient Name: Darryle Bernhardt          To Whom it may concern: This letter has been written at the patient's request. The above patient was seen at the [facility] for treatment of a medical condition.     This patient should

## (undated) NOTE — MR AVS SNAPSHOT
[place]  [address] [facility] [address]  [address] [facility] [address] [phone]               Thank you for choosing us for your health care visit with Mandy Allen MD.  We are glad to serve you and happy to provide you with this summa Bring a paper prescription for each of these medications    - Phentermine HCl 37.5 MG Tabs            Follow-up Instructions     Return in about 1 month (around 2/5/2017).          Referral Information     Referral Order Referred to [phone]

## (undated) NOTE — LETTER
Date: 9/8/2022    Patient Name: Nat Finley          To Whom it may concern: This letter has been written at the patient's request. The above patient was seen at the [facility] for treatment of a medical condition. Please provide a sit stand work desk to accommodate a spinal condition to reduce flares ups and loss of work days.         Sincerely,     Katia Whitmore M.S., JOVANNY, [address]  [address] [facility], [address] [place] [address] Debra Morgan, [address] [place] [address] Rd  [phone]

## (undated) NOTE — ED AVS SNAPSHOT
Lennox Murillo   MRN: [id number]    Department:  [facility] Emergency Department   Date of Visit:  12/31/2019           Disclosure     Insurance plans vary and the physician(s) referred by the ER may not be covered by your plan.  Please contact your i tell this physician (or your personal doctor if your instructions are to return to your personal doctor) about any new or lasting problems. The primary care or specialist physician will see patients referred from the [facility] Emergency Department.  Anjel Neal

## (undated) NOTE — MR AVS SNAPSHOT
[facility] [address] [place] [address]  [address] [facility] [address] [facility] [id number] [phone]               Thank you for choosing us for your health care visit with Darrick Lopez NP. We are glad to serve you and happy to provide you with this summary of your visit. infection or disease. This causes them to swell in size. Enlarged lymph nodes are often near the source of infection. This can help to find the cause of an infection.  For example, swollen lymph nodes around the jaw may be because of an infection in the nora in blood disorders and cancer (hematologist and oncologist). Treatment for lymphadenopathy  The treatment of enlarged lymph nodes depends on the cause. Enlarged lymph nodes are often harmless and go away without any treatment.  Treatment is most often done [email or web address]. If you've recently had a stay at the Hospital you can access your discharge instructions in [employer] by going to Visits < Admission Summaries.  If you've been to the Emergency Department or your doctor's office, you can view yo

## (undated) NOTE — LETTER
Date & Time: 1/14/2019, 6:44 PM  Patient: Olivia Molina  Encounter Provider(s):    Rosy Martin       To Whom It May Concern:    Suze Peña was seen and treated in our department on 1/14/2019. Ambulate as tolerated. Take breaks as needed.   No lif

## (undated) NOTE — MR AVS SNAPSHOT
[place]  [address] [facility]  [address] [facility] [address] [phone]               Thank you for choosing us for your health care visit with Nilsa Wolff MD.  We are glad to serve you and happy to provide you with this summa view more details from this visit by going to [email or web address]. If you've recently had a stay at the Hospital you can access your discharge instructions in [employer] by going to Visits < Admission Summaries.  If you've been to the Emergency Depar